# Patient Record
Sex: FEMALE | Race: WHITE | NOT HISPANIC OR LATINO | URBAN - METROPOLITAN AREA
[De-identification: names, ages, dates, MRNs, and addresses within clinical notes are randomized per-mention and may not be internally consistent; named-entity substitution may affect disease eponyms.]

---

## 2023-12-07 ENCOUNTER — HOSPITAL ENCOUNTER (EMERGENCY)
Facility: HOSPITAL | Age: 38
Discharge: HOME | End: 2023-12-08
Attending: EMERGENCY MEDICINE
Payer: MEDICAID

## 2023-12-07 ENCOUNTER — APPOINTMENT (OUTPATIENT)
Dept: CT IMAGING | Facility: HOSPITAL | Age: 38
End: 2023-12-07
Payer: MEDICAID

## 2023-12-07 DIAGNOSIS — R42 DIZZINESS: ICD-10-CM

## 2023-12-07 DIAGNOSIS — R10.84 GENERALIZED ABDOMINAL PAIN: Primary | ICD-10-CM

## 2023-12-07 LAB
ALBUMIN SERPL-MCNC: 4.7 G/DL (ref 3.5–5.2)
ALBUMIN/GLOB SERPL: 1.6 G/DL
ALP SERPL-CCNC: 45 U/L (ref 39–117)
ALT SERPL W P-5'-P-CCNC: 37 U/L (ref 1–33)
ANION GAP SERPL CALCULATED.3IONS-SCNC: 15.1 MMOL/L (ref 5–15)
AST SERPL-CCNC: 23 U/L (ref 1–32)
BACTERIA UR QL AUTO: ABNORMAL /HPF
BASOPHILS # BLD AUTO: 0.04 10*3/MM3 (ref 0–0.2)
BASOPHILS NFR BLD AUTO: 0.6 % (ref 0–1.5)
BILIRUB SERPL-MCNC: 0.2 MG/DL (ref 0–1.2)
BILIRUB UR QL STRIP: NEGATIVE
BUN SERPL-MCNC: 10 MG/DL (ref 6–20)
BUN/CREAT SERPL: 13.2 (ref 7–25)
CALCIUM SPEC-SCNC: 9.6 MG/DL (ref 8.6–10.5)
CHLORIDE SERPL-SCNC: 99 MMOL/L (ref 98–107)
CLARITY UR: CLEAR
CO2 SERPL-SCNC: 23.9 MMOL/L (ref 22–29)
COLOR UR: YELLOW
CREAT SERPL-MCNC: 0.76 MG/DL (ref 0.57–1)
DEPRECATED RDW RBC AUTO: 38.5 FL (ref 37–54)
EGFRCR SERPLBLD CKD-EPI 2021: 101.7 ML/MIN/1.73
EOSINOPHIL # BLD AUTO: 0.27 10*3/MM3 (ref 0–0.4)
EOSINOPHIL NFR BLD AUTO: 3.9 % (ref 0.3–6.2)
ERYTHROCYTE [DISTWIDTH] IN BLOOD BY AUTOMATED COUNT: 11.9 % (ref 12.3–15.4)
FLUAV SUBTYP SPEC NAA+PROBE: NOT DETECTED
FLUBV RNA ISLT QL NAA+PROBE: NOT DETECTED
GLOBULIN UR ELPH-MCNC: 2.9 GM/DL
GLUCOSE SERPL-MCNC: 142 MG/DL (ref 65–99)
GLUCOSE UR STRIP-MCNC: NEGATIVE MG/DL
HCG INTACT+B SERPL-ACNC: <0.5 MIU/ML
HCT VFR BLD AUTO: 42.1 % (ref 34–46.6)
HGB BLD-MCNC: 14.3 G/DL (ref 12–15.9)
HGB UR QL STRIP.AUTO: ABNORMAL
HOLD SPECIMEN: NORMAL
HOLD SPECIMEN: NORMAL
HYALINE CASTS UR QL AUTO: ABNORMAL /LPF
IMM GRANULOCYTES # BLD AUTO: 0.02 10*3/MM3 (ref 0–0.05)
IMM GRANULOCYTES NFR BLD AUTO: 0.3 % (ref 0–0.5)
KETONES UR QL STRIP: NEGATIVE
LEUKOCYTE ESTERASE UR QL STRIP.AUTO: NEGATIVE
LIPASE SERPL-CCNC: 31 U/L (ref 13–60)
LYMPHOCYTES # BLD AUTO: 2.56 10*3/MM3 (ref 0.7–3.1)
LYMPHOCYTES NFR BLD AUTO: 36.5 % (ref 19.6–45.3)
MCH RBC QN AUTO: 29.8 PG (ref 26.6–33)
MCHC RBC AUTO-ENTMCNC: 34 G/DL (ref 31.5–35.7)
MCV RBC AUTO: 87.7 FL (ref 79–97)
MONOCYTES # BLD AUTO: 0.33 10*3/MM3 (ref 0.1–0.9)
MONOCYTES NFR BLD AUTO: 4.7 % (ref 5–12)
NEUTROPHILS NFR BLD AUTO: 3.79 10*3/MM3 (ref 1.7–7)
NEUTROPHILS NFR BLD AUTO: 54 % (ref 42.7–76)
NITRITE UR QL STRIP: NEGATIVE
NRBC BLD AUTO-RTO: 0 /100 WBC (ref 0–0.2)
PH UR STRIP.AUTO: 5.5 [PH] (ref 5–8)
PLATELET # BLD AUTO: 329 10*3/MM3 (ref 140–450)
PMV BLD AUTO: 10.5 FL (ref 6–12)
POTASSIUM SERPL-SCNC: 3.9 MMOL/L (ref 3.5–5.2)
PROT SERPL-MCNC: 7.6 G/DL (ref 6–8.5)
PROT UR QL STRIP: NEGATIVE
RBC # BLD AUTO: 4.8 10*6/MM3 (ref 3.77–5.28)
RBC # UR STRIP: ABNORMAL /HPF
REF LAB TEST METHOD: ABNORMAL
RSV RNA NPH QL NAA+NON-PROBE: NOT DETECTED
SARS-COV-2 RNA RESP QL NAA+PROBE: NOT DETECTED
SODIUM SERPL-SCNC: 138 MMOL/L (ref 136–145)
SP GR UR STRIP: 1.01 (ref 1–1.03)
SQUAMOUS #/AREA URNS HPF: ABNORMAL /HPF
UROBILINOGEN UR QL STRIP: ABNORMAL
WBC # UR STRIP: ABNORMAL /HPF
WBC NRBC COR # BLD AUTO: 7.01 10*3/MM3 (ref 3.4–10.8)
WHOLE BLOOD HOLD COAG: NORMAL
WHOLE BLOOD HOLD SPECIMEN: NORMAL

## 2023-12-07 PROCEDURE — 87637 SARSCOV2&INF A&B&RSV AMP PRB: CPT | Performed by: EMERGENCY MEDICINE

## 2023-12-07 PROCEDURE — 36415 COLL VENOUS BLD VENIPUNCTURE: CPT

## 2023-12-07 PROCEDURE — 81001 URINALYSIS AUTO W/SCOPE: CPT

## 2023-12-07 PROCEDURE — 84702 CHORIONIC GONADOTROPIN TEST: CPT

## 2023-12-07 PROCEDURE — 83690 ASSAY OF LIPASE: CPT

## 2023-12-07 PROCEDURE — 80053 COMPREHEN METABOLIC PANEL: CPT

## 2023-12-07 PROCEDURE — 74177 CT ABD & PELVIS W/CONTRAST: CPT

## 2023-12-07 PROCEDURE — 25510000001 IOPAMIDOL PER 1 ML

## 2023-12-07 PROCEDURE — 99285 EMERGENCY DEPT VISIT HI MDM: CPT

## 2023-12-07 PROCEDURE — 85025 COMPLETE CBC W/AUTO DIFF WBC: CPT

## 2023-12-07 RX ORDER — SODIUM CHLORIDE 0.9 % (FLUSH) 0.9 %
10 SYRINGE (ML) INJECTION AS NEEDED
Status: DISCONTINUED | OUTPATIENT
Start: 2023-12-07 | End: 2023-12-08 | Stop reason: HOSPADM

## 2023-12-07 RX ADMIN — IOPAMIDOL 90 ML: 755 INJECTION, SOLUTION INTRAVENOUS at 22:07

## 2023-12-08 VITALS
TEMPERATURE: 98.3 F | RESPIRATION RATE: 18 BRPM | DIASTOLIC BLOOD PRESSURE: 109 MMHG | BODY MASS INDEX: 34.8 KG/M2 | OXYGEN SATURATION: 99 % | SYSTOLIC BLOOD PRESSURE: 139 MMHG | WEIGHT: 196.43 LBS | HEART RATE: 88 BPM | HEIGHT: 63 IN

## 2023-12-08 PROCEDURE — 93005 ELECTROCARDIOGRAM TRACING: CPT

## 2023-12-08 RX ORDER — MECLIZINE HYDROCHLORIDE 25 MG/1
25 TABLET ORAL ONCE
Status: COMPLETED | OUTPATIENT
Start: 2023-12-08 | End: 2023-12-08

## 2023-12-08 RX ORDER — DICYCLOMINE HCL 20 MG
20 TABLET ORAL EVERY 6 HOURS
Qty: 20 TABLET | Refills: 0 | Status: SHIPPED | OUTPATIENT
Start: 2023-12-08

## 2023-12-08 RX ORDER — MECLIZINE HYDROCHLORIDE 25 MG/1
25 TABLET ORAL 3 TIMES DAILY PRN
Qty: 15 TABLET | Refills: 0 | Status: SHIPPED | OUTPATIENT
Start: 2023-12-08

## 2023-12-08 RX ORDER — DICYCLOMINE HYDROCHLORIDE 10 MG/1
20 CAPSULE ORAL ONCE
Status: COMPLETED | OUTPATIENT
Start: 2023-12-08 | End: 2023-12-08

## 2023-12-08 RX ADMIN — DICYCLOMINE HYDROCHLORIDE 20 MG: 10 CAPSULE ORAL at 00:55

## 2023-12-08 RX ADMIN — MECLIZINE HYDROCHLORIDE 25 MG: 25 TABLET ORAL at 00:55

## 2023-12-08 NOTE — DISCHARGE INSTRUCTIONS
Please be sure to follow-up with your primary care provider your dizziness and blood pressure which was elevated here in the ED today.  You should also follow-up with the GI specialist listed here, Dr. Rizo, concerning your ongoing diarrhea and abdominal pain as well as the CT findings of fatty liver.

## 2023-12-08 NOTE — ED PROVIDER NOTES
Time: 8:47 PM EST  Date of encounter:  12/7/2023  Independent Historian/Clinical History and Information was obtained by:   Patient    History is limited by: N/A    Chief Complaint   Patient presents with    Dizziness    Nausea    Abdominal Pain    Migraine    Flank Pain    Syncope         History of Present Illness:  Patient is a 38 y.o. year old female who presents to the emergency department for evaluation of bilateral flank pain and abdominal pain ongoing for several days.  Patient also reports nausea without vomiting.  Denies dysuria, hematuria, diarrhea.  Reports episodes of dizziness with near syncope at home.  Denies any loss of consciousness.  Denies any fevers, chills, body aches.  Denies chest pain or shortness of breath. (NAVYA Madera Provider in Triage)      Patient Care Team  Primary Care Provider: Provider, No Known    Past Medical History:     No Known Allergies  History reviewed. No pertinent past medical history.  Past Surgical History:   Procedure Laterality Date    TUBAL ABDOMINAL LIGATION       History reviewed. No pertinent family history.    Home Medications:  Prior to Admission medications    Medication Sig Start Date End Date Taking? Authorizing Provider   acetaminophen (TYLENOL) 500 MG tablet Take 1 tablet by mouth Every 6 (Six) Hours As Needed for Mild Pain. 8/25/23   Markus Lopez PA   diclofenac (VOLTAREN) 50 MG EC tablet Take 1 tablet by mouth 3 (Three) Times a Day. 8/25/23   Markus Lopez PA   penicillin v potassium (VEETID) 500 MG tablet Take 1 tablet by mouth 4 (Four) Times a Day. 8/25/23   Markus Lopez PA        Social History:   Social History     Tobacco Use    Smoking status: Never    Smokeless tobacco: Never   Vaping Use    Vaping Use: Never used   Substance Use Topics    Alcohol use: Not Currently     Comment: occ    Drug use: Never         Review of Systems:  Review of Systems   Constitutional:  Negative for fever.   HENT:  Negative for sore throat.   "  Eyes: Negative.    Respiratory:  Negative for cough and shortness of breath.    Cardiovascular:  Negative for chest pain.   Gastrointestinal:  Positive for abdominal distention and nausea. Negative for abdominal pain, diarrhea and vomiting.   Genitourinary:  Positive for flank pain. Negative for dysuria, frequency, hematuria and urgency.   Musculoskeletal:  Negative for neck pain.   Skin:  Negative for rash.   Allergic/Immunologic: Negative.    Neurological:  Positive for dizziness and headaches. Negative for weakness and numbness.   Hematological: Negative.    Psychiatric/Behavioral: Negative.     All other systems reviewed and are negative.       Physical Exam:  BP (!) 139/109 (BP Location: Left arm, Patient Position: Sitting)   Pulse 88   Temp 98.3 °F (36.8 °C) (Oral)   Resp 18   Ht 160 cm (63\")   Wt 89.1 kg (196 lb 6.9 oz)   SpO2 99%   BMI 34.80 kg/m²         Physical Exam  Vitals and nursing note reviewed.   Constitutional:       General: She is not in acute distress.     Appearance: Normal appearance. She is not toxic-appearing.   HENT:      Head: Normocephalic and atraumatic.      Jaw: There is normal jaw occlusion.      Mouth/Throat:      Mouth: Mucous membranes are moist.   Eyes:      General: Lids are normal.      Extraocular Movements: Extraocular movements intact.      Conjunctiva/sclera: Conjunctivae normal.      Pupils: Pupils are equal, round, and reactive to light.   Cardiovascular:      Rate and Rhythm: Normal rate and regular rhythm.      Pulses: Normal pulses.      Heart sounds: Normal heart sounds.   Pulmonary:      Effort: Pulmonary effort is normal. No respiratory distress.      Breath sounds: Normal breath sounds. No wheezing or rhonchi.   Abdominal:      General: Abdomen is flat. There is no distension.      Palpations: Abdomen is soft.      Tenderness: There is no abdominal tenderness. There is no guarding or rebound.   Musculoskeletal:         General: Normal range of motion.      " Cervical back: Normal range of motion and neck supple.      Right lower leg: No edema.      Left lower leg: No edema.   Skin:     General: Skin is warm and dry.   Neurological:      General: No focal deficit present.      Mental Status: She is alert and oriented to person, place, and time. Mental status is at baseline.   Psychiatric:         Mood and Affect: Mood normal.         Behavior: Behavior normal.              Procedures:  Procedures      Medical Decision Making:      Comorbidities that affect care:    Migraines    External Notes reviewed:    Previous Clinic Note: Urgent care visit from 8/25/2023      The following orders were placed and all results were independently analyzed by me:  Orders Placed This Encounter   Procedures    COVID-19, FLU A/B, RSV PCR 1 HR TAT - Swab, Nasopharynx    CT Abdomen Pelvis With Contrast    [place] Draw    Comprehensive Metabolic Panel    Lipase    Urinalysis With Microscopic If Indicated (No Culture) - Urine, Clean Catch    hCG, Quantitative, Pregnancy    CBC Auto Differential    Urinalysis, Microscopic Only - Urine, Clean Catch    NPO Diet NPO Type: Strict NPO    Undress & Gown    Orthostatic Vitals    ECG 12 Lead Other; dizziness    Insert Peripheral IV    CBC & Differential    Green Top (Gel)    Lavender Top    Gold Top - SST    Light Blue Top       Medications Given in the Emergency Department:  Medications   sodium chloride 0.9 % flush 10 mL (has no administration in time range)   iopamidol (ISOVUE-370) 76 % injection 100 mL (90 mL Intravenous Given 12/7/23 2207)   meclizine (ANTIVERT) tablet 25 mg (25 mg Oral Given 12/8/23 0055)   dicyclomine (BENTYL) capsule 20 mg (20 mg Oral Given 12/8/23 0055)        ED Course:    The patient was initially evaluated in the triage area where orders were placed. The patient was later dispositioned by NAVYA Chong.      The patient was advised to stay for completion of workup which includes but is not limited to communication of  labs and radiological results, reassessment and plan. The patient was advised that leaving prior to disposition by a provider could result in critical findings that are not communicated to the patient.     ED Course as of 12/08/23 0118   u Dec 07, 2023   2049   --- PROVIDER IN TRIAGE NOTE ---    The patient was evaluated by Carol ho in triage. Orders were placed and the patient is currently awaiting disposition.      [CE]   Fri Dec 08, 2023   0117 Orthostatic vital signs were negative for orthostatic hypotension. [RM]      ED Course User Index  [CE] Carol Nance APRN  [RM] Abena Ballesteros APRN       Labs:    Lab Results (last 24 hours)       Procedure Component Value Units Date/Time    CBC & Differential [[id number]]  (Abnormal) Collected: 12/07/23 2052    Specimen: Blood from Arm, Right Updated: 12/07/23 2107    Narrative:      The following orders were created for panel order CBC & Differential.  Procedure                               Abnormality         Status                     ---------                               -----------         ------                     CBC Auto Differential[[id number]]        Abnormal            Final result                 Please view results for these tests on the individual orders.    Comprehensive Metabolic Panel [[id number]]  (Abnormal) Collected: 12/07/23 2052    Specimen: Blood from Arm, Right Updated: 12/07/23 2133     Glucose 142 mg/dL      BUN 10 mg/dL      Creatinine 0.76 mg/dL      Sodium 138 mmol/L      Potassium 3.9 mmol/L      Chloride 99 mmol/L      CO2 23.9 mmol/L      Calcium 9.6 mg/dL      Total Protein 7.6 g/dL      Albumin 4.7 g/dL      ALT (SGPT) 37 U/L      AST (SGOT) 23 U/L      Alkaline Phosphatase 45 U/L      Total Bilirubin 0.2 mg/dL      Globulin 2.9 gm/dL      A/G Ratio 1.6 g/dL      BUN/Creatinine Ratio 13.2     Anion Gap 15.1 mmol/L      eGFR 101.7 mL/min/1.73     Narrative:      GFR Normal >60  Chronic Kidney Disease <60  Kidney Failure  <15      Lipase [[id number]]  (Normal) Collected: 12/07/23 2052    Specimen: Blood from Arm, Right Updated: 12/07/23 2133     Lipase 31 U/L     hCG, Quantitative, Pregnancy [[id number]] Collected: 12/07/23 2052    Specimen: Blood from Arm, Right Updated: 12/07/23 2130     HCG Quantitative <0.50 mIU/mL     Narrative:      HCG Ranges by Gestational Age    Females - non-pregnant premenopausal   </= 1mIU/mL HCG  Females - postmenopausal               </= 7mIU/mL HCG    3 Weeks       5.4   -      72 mIU/mL  4 Weeks      10.2   -     708 mIU/mL  5 Weeks       217   -   8,245 mIU/mL  6 Weeks       152   -  32,177 mIU/mL  7 Weeks     4,059   - 153,767 mIU/mL  8 Weeks    31,366   - 149,094 mIU/mL  9 Weeks    59,109   - 135,901 mIU/mL  10 Weeks   44,186   - 170,409 mIU/mL  12 Weeks   27,107   - 201,615 mIU/mL  14 Weeks   24,302   -  93,646 mIU/mL  15 Weeks   12,540   -  69,747 mIU/mL  16 Weeks    8,904   -  55,332 mIU/mL  17 Weeks    8,240   -  51,793 mIU/mL  18 Weeks    9,649   -  55,271 mIU/mL      CBC Auto Differential [[id number]]  (Abnormal) Collected: 12/07/23 2052    Specimen: Blood from Arm, Right Updated: 12/07/23 2107     WBC 7.01 10*3/mm3      RBC 4.80 10*6/mm3      Hemoglobin 14.3 g/dL      Hematocrit 42.1 %      MCV 87.7 fL      MCH 29.8 pg      MCHC 34.0 g/dL      RDW 11.9 %      RDW-SD 38.5 fl      MPV 10.5 fL      Platelets 329 10*3/mm3      Neutrophil % 54.0 %      Lymphocyte % 36.5 %      Monocyte % 4.7 %      Eosinophil % 3.9 %      Basophil % 0.6 %      Immature Grans % 0.3 %      Neutrophils, Absolute 3.79 10*3/mm3      Lymphocytes, Absolute 2.56 10*3/mm3      Monocytes, Absolute 0.33 10*3/mm3      Eosinophils, Absolute 0.27 10*3/mm3      Basophils, Absolute 0.04 10*3/mm3      Immature Grans, Absolute 0.02 10*3/mm3      nRBC 0.0 /100 WBC     Urinalysis With Microscopic If Indicated (No Culture) - Urine, Clean Catch [[id number]]  (Abnormal) Collected: 12/07/23 2057    Specimen: Urine, Clean Catch Updated:  12/07/23 2116     Color, UA Yellow     Appearance, UA Clear     pH, UA 5.5     Specific Gravity, UA 1.012     Glucose, UA Negative     Ketones, UA Negative     Bilirubin, UA Negative     Blood, UA Small (1+)     Protein, UA Negative     Leuk Esterase, UA Negative     Nitrite, UA Negative     Urobilinogen, UA 0.2 E.U./dL    Urinalysis, Microscopic Only - Urine, Clean Catch [[id number]]  (Abnormal) Collected: 12/07/23 2057    Specimen: Urine, Clean Catch Updated: 12/07/23 2116     RBC, UA 6-10 /HPF      WBC, UA 0-2 /HPF      Bacteria, UA 1+ /HPF      Squamous Epithelial Cells, UA 0-2 /HPF      Hyaline Casts, UA None Seen /LPF      Methodology Automated Microscopy    COVID-19, FLU A/B, RSV PCR 1 HR TAT - Swab, Nasopharynx [[id number]]  (Normal) Collected: 12/07/23 2244    Specimen: Swab from Nasopharynx Updated: 12/07/23 2330     COVID19 Not Detected     Influenza A PCR Not Detected     Influenza B PCR Not Detected     RSV, PCR Not Detected    Narrative:      Fact sheet for providers: https://www.fda.gov/media/164489/download    Fact sheet for patients: https://www.fda.gov/media/518812/download    Test performed by PCR.             Imaging:    CT Abdomen Pelvis With Contrast    Result Date: 12/7/2023  PROCEDURE: CT ABDOMEN PELVIS W CONTRAST  COMPARISON: None  INDICATIONS: RIGHTABDOMINAL/FLANK PAIN X 3 DAYS  TECHNIQUE: After obtaining the patient's consent, CT images were created with non-ionic intravenous contrast material.   PROTOCOL:   Standard imaging protocol performed    RADIATION:   DLP: 1046.4 mGy*cm   Automated exposure control was utilized to minimize radiation dose. CONTRAST: 90 cc Isovue 370 I.V. LABS:   eGFR: 101.7 ml/min/1.73m2  FINDINGS:  No consolidations or pleural effusions are seen involving the lung bases.  Heterogeneous fatty infiltration is seen throughout the liver.  Areas of fatty sparing are noted.  There is a hyperdense focus at the dome of the liver which is felt to be related to differential  enhancement of the liver parenchyma.  The spleen and pancreas are unremarkable. The gallbladder and biliary tree are unremarkable. The bilateral adrenal glands are symmetric and unremarkable.  A left renal cyst is noted.  The bilateral kidneys are otherwise unremarkable.  No significant bowel dilatation or obstruction is seen. A normal-appearing appendix is observed. There is no evidence for acute appendicitis. No abnormal fluid collections are seen. No significant free fluid is observed. No significant lymphadenopathy is seen throughout the abdomen or pelvis. The bladder is decompressed. The celiac and superior mesenteric arterial distributions are well opacified without evidence for occlusion.  No acute osseous abnormalities are seen.        1. No evidence for acute abnormality throughout the abdomen or pelvis. 2. Evidence for diffuse heterogeneous fatty infiltration.  Apparent hyperdense foci are seen which are likely related to areas of focal fatty sparing and differential enhancement for the phase of contrast.  Enhancing lesions are felt unlikely.  Follow-up nonemergent outpatient MRI of the liver may be considered for confirmation.     EVERETT CALLES MD       Electronically Signed and Approved By: EVERETT CALLES MD on 12/07/2023 at 22:21                Differential Diagnosis and Discussion:      Abdominal Pain: Based on the patient's signs and symptoms, I considered abdominal aortic aneurysm, small bowel obstruction, pancreatitis, acute cholecystitis, acute appendecitis, peptic ulcer disease, gastritis, colitis, endocrine disorders, irritable bowel syndrome and other differential diagnosis an etiology of the patient's abdominal pain.  Dizziness: Based on the patient's history, signs, and symptoms, the diffential diagnosis includes but is not limited to meningitis, stroke, sepsis, subarachnoid hemorrhage, intracranial bleeding, encephalitis, vertigo, electrolyte imbalance, and metabolic disorders.    All labs  were reviewed and interpreted by me.  EKG was interpreted by supervising attending.  CT scan radiology impression was interpreted by me.    MDM     Amount and/or Complexity of Data Reviewed  Clinical lab tests: reviewed  Tests in the radiology section of CPT®: reviewed    The patient is resting comfortably and feels better, is alert, talkative and in no distress. The repeat examination is unremarkable and benign. The patient is neurologically intact, has a normal mental status and this ambulatory in the ED. The history, exam, diagnostic testing in the patient's current condition do not suggest meningitis, stroke, sepsis, subarachnoid hemorrhage, intracranial bleeding, encephalitis or other significant pathology that would warrant further testing, continued ED treatment, admission, neurological consultation, or other specialist evaluation at this point. The vital signs have been stable. The patient's condition is stable and appropriate for discharge. The patient will pursue further outpatient evaluation with the primary care physician or other designated or consulting position as indicated in the discharge instructions.            Patient Care Considerations:    CONSULT: I considered consulting GI, however no acute complications CT HEAD: I considered ordering a noncontrast CT of the head, however patient has no neurological deficits.      Consultants/Shared Management Plan:    None    Social Determinants of Health:    Patient is independent, reliable, and has access to care.       Disposition and Care Coordination:        I have explained the patient´s condition, diagnoses and treatment plan based on the information available to me at this time. I have answered questions and addressed any concerns. The patient has a good  understanding of the patient´s diagnosis, condition, and treatment plan as can be expected at this point. The vital signs have been stable. The patient´s condition is stable and appropriate for  discharge from the emergency department.      The patient will pursue further outpatient evaluation with the primary care physician or other designated or consulting physician as outlined in the discharge instructions. They are agreeable to this plan of care and follow-up instructions have been explained in detail. The patient has received these instructions in written format and have expressed an understanding of the discharge instructions. The patient is aware that any significant change in condition or worsening of symptoms should prompt an immediate return to this or the closest emergency department or call to 911.  I have explained discharge medications and the need for follow up with the patient/caretakers. This was also printed in the discharge instructions. Patient was discharged with the following medications and follow up:      Medication List        New Prescriptions      dicyclomine 20 MG tablet  Commonly known as: BENTYL  Take 1 tablet by mouth Every 6 (Six) Hours.     meclizine 25 MG tablet  Commonly known as: ANTIVERT  Take 1 tablet by mouth 3 (Three) Times a Day As Needed for Dizziness.               Where to Get Your Medications        These medications were sent to [id number] - [place], [place] - [address]  AT [facility] [address] - [phone] [facility] [phone]   [address] [facility] [address]      Phone: [phone]   dicyclomine 20 MG tablet  meclizine 25 MG tablet      Provider, No Known  [facility] [id number]    Call in 2 days      Víctor Rizo MD  [address] [facility] [facility] [place] [address]  [phone]    Call in 2 days         Final diagnoses:   Generalized abdominal pain   Dizziness        ED Disposition       ED Disposition   Discharge    Condition   Stable    Comment   --               This medical record created using voice recognition software.             Abena Ballesteros, NAVYA  12/08/23 0118

## 2023-12-10 LAB
QT INTERVAL: 378 MS
QTC INTERVAL: 447 MS

## 2024-04-25 ENCOUNTER — OFFICE VISIT (OUTPATIENT)
Dept: FAMILY MEDICINE CLINIC | Facility: CLINIC | Age: 39
End: 2024-04-25
Payer: MEDICAID

## 2024-04-25 VITALS
WEIGHT: 200 LBS | SYSTOLIC BLOOD PRESSURE: 147 MMHG | DIASTOLIC BLOOD PRESSURE: 95 MMHG | HEIGHT: 63 IN | HEART RATE: 117 BPM | OXYGEN SATURATION: 95 % | BODY MASS INDEX: 35.44 KG/M2

## 2024-04-25 DIAGNOSIS — Z80.0 FAMILY HISTORY OF COLON CANCER: ICD-10-CM

## 2024-04-25 DIAGNOSIS — K92.1 BLOOD IN STOOL: ICD-10-CM

## 2024-04-25 DIAGNOSIS — Z12.31 BREAST CANCER SCREENING BY MAMMOGRAM: ICD-10-CM

## 2024-04-25 DIAGNOSIS — R10.32 LLQ PAIN: ICD-10-CM

## 2024-04-25 DIAGNOSIS — Z11.59 NEED FOR HEPATITIS C SCREENING TEST: ICD-10-CM

## 2024-04-25 DIAGNOSIS — R11.0 NAUSEA: ICD-10-CM

## 2024-04-25 DIAGNOSIS — R19.7 DIARRHEA, UNSPECIFIED TYPE: ICD-10-CM

## 2024-04-25 DIAGNOSIS — Z13.220 SCREENING FOR LIPID DISORDERS: ICD-10-CM

## 2024-04-25 DIAGNOSIS — Z76.89 ENCOUNTER TO ESTABLISH CARE: ICD-10-CM

## 2024-04-25 DIAGNOSIS — R03.0 ELEVATED BLOOD PRESSURE READING IN OFFICE WITHOUT DIAGNOSIS OF HYPERTENSION: ICD-10-CM

## 2024-04-25 DIAGNOSIS — Z00.00 ANNUAL PHYSICAL EXAM: Primary | ICD-10-CM

## 2024-04-25 DIAGNOSIS — Z12.4 SCREENING FOR CERVICAL CANCER: ICD-10-CM

## 2024-04-25 DIAGNOSIS — Z13.29 SCREENING FOR THYROID DISORDER: ICD-10-CM

## 2024-04-25 NOTE — PROGRESS NOTES
Chief Complaint  Establish Care and Annual Exam    SUBJECTIVE  Clover Caicedo presents to [facility]    History of Present Illness  Patient is a 38-year-old female who presents today to establish care.  Pt unsure of previous PCP.  She is due annual physical exam, to be done in office today.  She denies any chronic conditions.  Patient reports a history of gestational diabetes.  Patient reports she is here in [place] as her  is working at the [employer].  Patient reports she believes they will only be here until about August.    Patient reports complaints of left lower abdominal pain, diarrhea for several months.  Patient was seen in the ER November CT of the abdomen showed slight liver fatty infiltrates otherwise normal.  Patient reports 1 week ago she had blood in her stool.  Patient reports she had an episode last summer she blood in her stool as well patient reports family history of colon cancer in her grandfather and her father has Crohn's disease.  Also has occasional nausea with this.    Patient's blood pressure is noted to be elevated in office today at 147/95.  Patient reports she had some hypertension during pregnancy but otherwise normal.  Patient reports she does have a history of whitecoat syndrome.  Denies any chest pain.    TDAP- 4/2016    Mammogram- she is due, orders to be placed today.     PAP- 2020, had tubal ligation and ablation. Pt has hx of abnormal paps. We will refer to GYN to establish care and have PAP done.     Patient is due labs. Orders placed at today's visit. Discussed with patient that these are fasting labs.     No other concerns or complaints at this time.  Patient denies any constipation, urinary urgency, frequency, or dysuria, chest pain, shortness of breath or syncope.           History reviewed. No pertinent past medical history.   History reviewed. No pertinent family history.   Past Surgical History:   Procedure Laterality Date     "TUBAL ABDOMINAL LIGATION        No current outpatient medications on file.    OBJECTIVE  Vital Signs:   /95   Pulse 117   Ht 160 cm (63\")   Wt 90.7 kg (200 lb)   SpO2 95%   BMI 35.43 kg/m²    Estimated body mass index is 35.43 kg/m² as calculated from the following:    Height as of this encounter: 160 cm (63\").    Weight as of this encounter: 90.7 kg (200 lb).     Wt Readings from Last 3 Encounters:   04/25/24 90.7 kg (200 lb)   12/07/23 89.1 kg (196 lb 6.9 oz)   08/25/23 81.6 kg (180 lb)     BP Readings from Last 3 Encounters:   04/25/24 147/95   12/08/23 (!) 139/109   08/25/23 152/97       Physical Exam  Vitals reviewed.   Constitutional:       General: She is awake. She is not in acute distress.     Appearance: She is well-developed and well-groomed. She is not ill-appearing.   HENT:      Head: Normocephalic and atraumatic.      Right Ear: Tympanic membrane, ear canal and external ear normal.      Left Ear: Tympanic membrane, ear canal and external ear normal.      Nose: Nose normal.      Mouth/Throat:      Mouth: Mucous membranes are moist.      Pharynx: Oropharynx is clear. No oropharyngeal exudate or posterior oropharyngeal erythema.   Eyes:      Extraocular Movements: Extraocular movements intact.      Conjunctiva/sclera: Conjunctivae normal.      Pupils: Pupils are equal, round, and reactive to light.   Neck:      Thyroid: No thyroid mass, thyromegaly or thyroid tenderness.      Vascular: No carotid bruit.   Cardiovascular:      Rate and Rhythm: Normal rate and regular rhythm.      Pulses:           Carotid pulses are 2+ on the right side and 2+ on the left side.     Heart sounds: Normal heart sounds.   Pulmonary:      Effort: Pulmonary effort is normal.      Breath sounds: Normal breath sounds.   Abdominal:      General: Abdomen is flat. Bowel sounds are normal. There is no distension.      Palpations: Abdomen is soft.      Tenderness: There is no abdominal tenderness.   Musculoskeletal:         " General: Normal range of motion.      Cervical back: Normal range of motion and neck supple. No rigidity or tenderness.   Lymphadenopathy:      Cervical: No cervical adenopathy.   Skin:     General: Skin is warm and dry.   Neurological:      Mental Status: She is alert and oriented to person, place, and time.   Psychiatric:         Mood and Affect: Mood normal.         Behavior: Behavior normal. Behavior is cooperative.         Thought Content: Thought content normal.         Judgment: Judgment normal.          Result Review    Common labs          12/7/2023    20:52   Common Labs   Glucose 142    BUN 10    Creatinine 0.76    Sodium 138    Potassium 3.9    Chloride 99    Calcium 9.6    Albumin 4.7    Total Bilirubin 0.2    Alkaline Phosphatase 45    AST (SGOT) 23    ALT (SGPT) 37    WBC 7.01    Hemoglobin 14.3    Hematocrit 42.1    Platelets 329        No Images in the past 120 days found..      The above data has been reviewed by NAVYA Diego 04/25/2024 09:56 EDT.          Patient Care Team:  Kayla Lane APRN as PCP - General (Nurse Practitioner)           ASSESSMENT & PLAN    Diagnoses and all orders for this visit:    1. Annual physical exam (Primary)  Comments:  preventative counseling  Healthy diet  Daily exercise  Get adequate sleep  Orders:  -     Comprehensive Metabolic Panel; Future  -     CBC & Differential; Future  -     Lipid Panel; Future  -     TSH+Free T4; Future  -     Hepatitis C antibody; Future    2. Encounter to establish care  Comments:  Medical history and medications reviewed    3. Screening for lipid disorders  -     Lipid Panel; Future    4. Screening for thyroid disorder  -     TSH+Free T4; Future    5. Need for hepatitis C screening test  -     Hepatitis C antibody; Future    6. Breast cancer screening by mammogram  -     Mammo Screening Digital Tomosynthesis Bilateral With CAD; Future    7. Screening for cervical cancer  -     Ambulatory Referral to Gynecology    8. LLQ pain  -      Ambulatory Referral to Gastroenterology    9. Diarrhea, unspecified type  -     Ambulatory Referral to Gastroenterology    10. Nausea  -     Ambulatory Referral to Gastroenterology    11. Blood in stool  -     Ambulatory Referral to Gastroenterology    12. Family history of colon cancer  -     Ambulatory Referral to Gastroenterology    13. Elevated blood pressure reading in office without diagnosis of hypertension       Will refer to GI for assessment of her abdominal pain diarrhea and blood in stool.  Advised patient that they may be booked out for several months.  Advised patient we will call the office to see if we can get her in any sooner.  Ordered ambulatory blood pressure monitoring as blood pressure is elevated in office today.  Instructed patient to check her blood pressure daily and keep a log and follow-up in 1 month for review.    Tobacco Use: Low Risk  (4/25/2024)    Patient History     Smoking Tobacco Use: Never     Smokeless Tobacco Use: Never     Passive Exposure: Never       Follow Up     Return in about 1 month (around 5/25/2024) for bp review.      Patient was given instructions and counseling regarding her condition or for health maintenance advice. Please see specific information pulled into the AVS if appropriate.   I have reviewed information obtained and documented by others and I have confirmed the accuracy of this documented note.    NAVYA Diego

## 2024-04-26 ENCOUNTER — PATIENT ROUNDING (BHMG ONLY) (OUTPATIENT)
Dept: FAMILY MEDICINE CLINIC | Facility: CLINIC | Age: 39
End: 2024-04-26
Payer: MEDICAID

## 2024-04-29 ENCOUNTER — LAB (OUTPATIENT)
Dept: LAB | Facility: HOSPITAL | Age: 39
End: 2024-04-29

## 2024-04-29 DIAGNOSIS — Z13.220 SCREENING FOR LIPID DISORDERS: ICD-10-CM

## 2024-04-29 DIAGNOSIS — Z11.59 NEED FOR HEPATITIS C SCREENING TEST: ICD-10-CM

## 2024-04-29 DIAGNOSIS — Z00.00 ANNUAL PHYSICAL EXAM: ICD-10-CM

## 2024-04-29 DIAGNOSIS — Z13.29 SCREENING FOR THYROID DISORDER: ICD-10-CM

## 2024-04-29 DIAGNOSIS — R73.09 ELEVATED GLUCOSE: ICD-10-CM

## 2024-04-29 LAB
ALBUMIN SERPL-MCNC: 4.6 G/DL (ref 3.5–5.2)
ALBUMIN/GLOB SERPL: 1.6 G/DL
ALP SERPL-CCNC: 38 U/L (ref 39–117)
ALT SERPL W P-5'-P-CCNC: 31 U/L (ref 1–33)
ANION GAP SERPL CALCULATED.3IONS-SCNC: 12.7 MMOL/L (ref 5–15)
AST SERPL-CCNC: 21 U/L (ref 1–32)
BASOPHILS # BLD AUTO: 0.04 10*3/MM3 (ref 0–0.2)
BASOPHILS NFR BLD AUTO: 0.6 % (ref 0–1.5)
BILIRUB SERPL-MCNC: 0.4 MG/DL (ref 0–1.2)
BUN SERPL-MCNC: 10 MG/DL (ref 6–20)
BUN/CREAT SERPL: 14.9 (ref 7–25)
CALCIUM SPEC-SCNC: 9.2 MG/DL (ref 8.6–10.5)
CHLORIDE SERPL-SCNC: 103 MMOL/L (ref 98–107)
CHOLEST SERPL-MCNC: 223 MG/DL (ref 0–200)
CO2 SERPL-SCNC: 23.3 MMOL/L (ref 22–29)
CREAT SERPL-MCNC: 0.67 MG/DL (ref 0.57–1)
DEPRECATED RDW RBC AUTO: 39.2 FL (ref 37–54)
EGFRCR SERPLBLD CKD-EPI 2021: 113.5 ML/MIN/1.73
EOSINOPHIL # BLD AUTO: 0.19 10*3/MM3 (ref 0–0.4)
EOSINOPHIL NFR BLD AUTO: 2.9 % (ref 0.3–6.2)
ERYTHROCYTE [DISTWIDTH] IN BLOOD BY AUTOMATED COUNT: 12.1 % (ref 12.3–15.4)
GLOBULIN UR ELPH-MCNC: 2.8 GM/DL
GLUCOSE SERPL-MCNC: 109 MG/DL (ref 65–99)
HCT VFR BLD AUTO: 41.5 % (ref 34–46.6)
HCV AB SER QL: NORMAL
HDLC SERPL-MCNC: 58 MG/DL (ref 40–60)
HGB BLD-MCNC: 14.1 G/DL (ref 12–15.9)
IMM GRANULOCYTES # BLD AUTO: 0.01 10*3/MM3 (ref 0–0.05)
IMM GRANULOCYTES NFR BLD AUTO: 0.2 % (ref 0–0.5)
LDLC SERPL CALC-MCNC: 138 MG/DL (ref 0–100)
LDLC/HDLC SERPL: 2.31 {RATIO}
LYMPHOCYTES # BLD AUTO: 2.17 10*3/MM3 (ref 0.7–3.1)
LYMPHOCYTES NFR BLD AUTO: 32.6 % (ref 19.6–45.3)
MCH RBC QN AUTO: 30.3 PG (ref 26.6–33)
MCHC RBC AUTO-ENTMCNC: 34 G/DL (ref 31.5–35.7)
MCV RBC AUTO: 89.1 FL (ref 79–97)
MONOCYTES # BLD AUTO: 0.38 10*3/MM3 (ref 0.1–0.9)
MONOCYTES NFR BLD AUTO: 5.7 % (ref 5–12)
NEUTROPHILS NFR BLD AUTO: 3.87 10*3/MM3 (ref 1.7–7)
NEUTROPHILS NFR BLD AUTO: 58 % (ref 42.7–76)
NRBC BLD AUTO-RTO: 0 /100 WBC (ref 0–0.2)
PLATELET # BLD AUTO: 299 10*3/MM3 (ref 140–450)
PMV BLD AUTO: 11.1 FL (ref 6–12)
POTASSIUM SERPL-SCNC: 4.1 MMOL/L (ref 3.5–5.2)
PROT SERPL-MCNC: 7.4 G/DL (ref 6–8.5)
RBC # BLD AUTO: 4.66 10*6/MM3 (ref 3.77–5.28)
SODIUM SERPL-SCNC: 139 MMOL/L (ref 136–145)
T4 FREE SERPL-MCNC: 1.01 NG/DL (ref 0.93–1.7)
TRIGL SERPL-MCNC: 154 MG/DL (ref 0–150)
TSH SERPL DL<=0.05 MIU/L-ACNC: 1.85 UIU/ML (ref 0.27–4.2)
VLDLC SERPL-MCNC: 27 MG/DL (ref 5–40)
WBC NRBC COR # BLD AUTO: 6.66 10*3/MM3 (ref 3.4–10.8)

## 2024-04-29 PROCEDURE — 36415 COLL VENOUS BLD VENIPUNCTURE: CPT

## 2024-04-29 PROCEDURE — 83036 HEMOGLOBIN GLYCOSYLATED A1C: CPT

## 2024-04-29 PROCEDURE — 86803 HEPATITIS C AB TEST: CPT

## 2024-04-29 PROCEDURE — 80053 COMPREHEN METABOLIC PANEL: CPT

## 2024-04-29 PROCEDURE — 84439 ASSAY OF FREE THYROXINE: CPT

## 2024-04-29 PROCEDURE — 80061 LIPID PANEL: CPT

## 2024-04-29 PROCEDURE — 85025 COMPLETE CBC W/AUTO DIFF WBC: CPT

## 2024-04-29 PROCEDURE — 84443 ASSAY THYROID STIM HORMONE: CPT

## 2024-04-30 DIAGNOSIS — R73.03 PREDIABETES: ICD-10-CM

## 2024-04-30 DIAGNOSIS — R73.09 ELEVATED GLUCOSE: Primary | ICD-10-CM

## 2024-04-30 DIAGNOSIS — E78.2 MIXED HYPERLIPIDEMIA: Primary | ICD-10-CM

## 2024-04-30 LAB — HBA1C MFR BLD: 5.8 % (ref 4.8–5.6)

## 2024-05-01 ENCOUNTER — PATIENT MESSAGE (OUTPATIENT)
Dept: FAMILY MEDICINE CLINIC | Facility: CLINIC | Age: 39
End: 2024-05-01

## 2024-05-01 DIAGNOSIS — R10.32 LLQ PAIN: Primary | ICD-10-CM

## 2024-05-01 RX ORDER — DICYCLOMINE HYDROCHLORIDE 10 MG/1
20 CAPSULE ORAL
Qty: 120 CAPSULE | Refills: 5 | Status: SHIPPED | OUTPATIENT
Start: 2024-05-01

## 2024-05-08 ENCOUNTER — HOSPITAL ENCOUNTER (OUTPATIENT)
Dept: MAMMOGRAPHY | Facility: HOSPITAL | Age: 39
Discharge: HOME | End: 2024-05-08

## 2024-05-08 DIAGNOSIS — Z12.31 BREAST CANCER SCREENING BY MAMMOGRAM: ICD-10-CM

## 2024-05-08 PROCEDURE — 77063 BREAST TOMOSYNTHESIS BI: CPT

## 2024-05-08 PROCEDURE — 77067 SCR MAMMO BI INCL CAD: CPT

## 2024-05-24 ENCOUNTER — OFFICE VISIT (OUTPATIENT)
Dept: FAMILY MEDICINE CLINIC | Facility: CLINIC | Age: 39
End: 2024-05-24

## 2024-05-24 VITALS
SYSTOLIC BLOOD PRESSURE: 130 MMHG | DIASTOLIC BLOOD PRESSURE: 85 MMHG | BODY MASS INDEX: 34.55 KG/M2 | HEIGHT: 63 IN | OXYGEN SATURATION: 99 % | HEART RATE: 81 BPM | WEIGHT: 195 LBS

## 2024-05-24 DIAGNOSIS — R19.7 DIARRHEA, UNSPECIFIED TYPE: ICD-10-CM

## 2024-05-24 DIAGNOSIS — E66.09 CLASS 1 OBESITY DUE TO EXCESS CALORIES WITHOUT SERIOUS COMORBIDITY WITH BODY MASS INDEX (BMI) OF 34.0 TO 34.9 IN ADULT: ICD-10-CM

## 2024-05-24 DIAGNOSIS — R03.0 ELEVATED BLOOD PRESSURE READING IN OFFICE WITHOUT DIAGNOSIS OF HYPERTENSION: Primary | ICD-10-CM

## 2024-05-24 PROBLEM — E66.811 CLASS 1 OBESITY DUE TO EXCESS CALORIES WITHOUT SERIOUS COMORBIDITY WITH BODY MASS INDEX (BMI) OF 34.0 TO 34.9 IN ADULT: Status: ACTIVE | Noted: 2024-05-24

## 2024-05-24 NOTE — PROGRESS NOTES
"Chief Complaint  Hypertension    SUBJECTIVE  Clover Caicedo presents to [facility]    History of Present Illness  Patient is here for follow-up on hypertension.  She is not currently taking any BP meds.Patient blood pressure in office today is reading 130/85.  Denies any chest pain, shortness of breath, headache, head pressure.  She brings in home blood pressure log.  Overall stable blood pressure at home.  A couple outliers 140s over 90s.  Patient has started to work on diet and exercise.    Patient is still having liquid diarrhea every day.  Patient reports still has occasional abdominal pain that Bentyl provides some relief for her.  She is not able to get into gastro until October.  Will place some stool study orders today.    History reviewed. No pertinent past medical history.   History reviewed. No pertinent family history.   Past Surgical History:   Procedure Laterality Date    TUBAL ABDOMINAL LIGATION          Current Outpatient Medications:     dicyclomine (BENTYL) 10 MG capsule, Take 2 capsules by mouth 4 (Four) Times a Day Before Meals & at Bedtime., Disp: 120 capsule, Rfl: 5    OBJECTIVE  Vital Signs:   /85 (BP Location: Right arm, Patient Position: Sitting, Cuff Size: Large Adult)   Pulse 81   Ht 160 cm (63\")   Wt 88.5 kg (195 lb)   SpO2 99%   BMI 34.54 kg/m²    Estimated body mass index is 34.54 kg/m² as calculated from the following:    Height as of this encounter: 160 cm (63\").    Weight as of this encounter: 88.5 kg (195 lb).     Wt Readings from Last 3 Encounters:   05/24/24 88.5 kg (195 lb)   04/25/24 90.7 kg (200 lb)   12/07/23 89.1 kg (196 lb 6.9 oz)     BP Readings from Last 3 Encounters:   05/24/24 130/85   04/25/24 147/95   12/08/23 (!) 139/109       Physical Exam  Vitals reviewed.   Constitutional:       General: She is not in acute distress.     Appearance: She is not ill-appearing.   HENT:      Head: Normocephalic and atraumatic.   Eyes:      " Conjunctiva/sclera: Conjunctivae normal.   Cardiovascular:      Rate and Rhythm: Normal rate and regular rhythm.      Heart sounds: Normal heart sounds.   Pulmonary:      Effort: Pulmonary effort is normal.      Breath sounds: Normal breath sounds.   Musculoskeletal:      Cervical back: Normal range of motion.   Skin:     General: Skin is warm and dry.   Neurological:      Mental Status: She is alert and oriented to person, place, and time.   Psychiatric:         Mood and Affect: Mood normal.         Behavior: Behavior normal.         Thought Content: Thought content normal.         Judgment: Judgment normal.          Result Review    Common labs          12/7/2023    20:52 4/29/2024    11:17   Common Labs   Glucose 142  109    BUN 10  10    Creatinine 0.76  0.67    Sodium 138  139    Potassium 3.9  4.1    Chloride 99  103    Calcium 9.6  9.2    Albumin 4.7  4.6    Total Bilirubin 0.2  0.4    Alkaline Phosphatase 45  38    AST (SGOT) 23  21    ALT (SGPT) 37  31    WBC 7.01  6.66    Hemoglobin 14.3  14.1    Hematocrit 42.1  41.5    Platelets 329  299    Total Cholesterol  223    Triglycerides  154    HDL Cholesterol  58    LDL Cholesterol   138    Hemoglobin A1C  5.80        Mammo Screening Digital Tomosynthesis Bilateral With CAD    Result Date: 5/8/2024  Benign findings. No mammographic evidence of malignancy. Recommend routine mammographic screening.  BI-RADS ASSESSMENT: BI-RADS 2. Benign findings.  The patient's information is entered into a computerized reminder system with a targeted due date for the next mammogram.  Note:  It has been reported that there is approximately a 15% false negative rate in mammography.  Therefore, management of a palpable abnormality should not be deferred because of a negative mammogram.     Electronically Signed By-NATTY DOTSON MD On:5/8/2024 12:07 PM          The above data has been reviewed by NAVYA Diego 05/24/2024 11:13 EDT.          Patient Care Team:  Kayla Lane,  NAVYA as PCP - General (Nurse Practitioner)            ASSESSMENT & PLAN    Diagnoses and all orders for this visit:    1. Elevated blood pressure reading in office without diagnosis of hypertension (Primary)  Comments:  stable, discussed heart healthy diet, low-sodium, losing weight, continue to check blood pressure at home, follow-up if consistently 140s over 90  Overview:  stable, discussed heart healthy diet, low-sodium, losing weight, continue to check blood pressure at home, follow-up if consistently 140s over 90      2. Diarrhea, unspecified type  Comments:  stool studies ordered, continue bentyl as needed  Overview:  stool studies ordered, continue bentyl as needed    Orders:  -     Clostridioides difficile Toxin, PCR - Stool, Per Rectum; Future  -     Enteric Bacterial Panel - Stool, Per Rectum; Future  -     Enteric Parasite Panel - Stool, Per Rectum; Future  -     Fecal Lactoferrin Qual. - Stool, Per Rectum; Future  -     Pancreatic Elastase, Fecal - Stool, Per Rectum; Future  -     H. Pylori Antigen, Stool - Stool, Per Rectum; Future    3. Class 1 obesity due to excess calories without serious comorbidity with body mass index (BMI) of 34.0 to 34.9 in adult  Comments:  Calorie deficit, small frequent meals, tracking food, high-protein, exercise discussed         Tobacco Use: Low Risk  (5/24/2024)    Patient History     Smoking Tobacco Use: Never     Smokeless Tobacco Use: Never     Passive Exposure: Never       Follow Up     Return in about 3 months (around 8/24/2024) for Next scheduled follow up.      Patient was given instructions and counseling regarding her condition or for health maintenance advice. Please see specific information pulled into the AVS if appropriate.   I have reviewed information obtained and documented by others and I have confirmed the accuracy of this documented note.    NAVYA Diego

## 2024-05-29 ENCOUNTER — LAB (OUTPATIENT)
Dept: LAB | Facility: HOSPITAL | Age: 39
End: 2024-05-29

## 2024-05-29 DIAGNOSIS — R19.7 DIARRHEA, UNSPECIFIED TYPE: ICD-10-CM

## 2024-05-29 LAB
027 TOXIN: NORMAL
C DIFF TOX GENS STL QL NAA+PROBE: NEGATIVE
H. PYLORI ANTIGEN STOOL: NEGATIVE
LACTOFERRIN STL QL LA: NEGATIVE

## 2024-05-29 PROCEDURE — 87338 HPYLORI STOOL AG IA: CPT

## 2024-05-29 PROCEDURE — 83630 LACTOFERRIN FECAL (QUAL): CPT

## 2024-05-29 PROCEDURE — 87506 IADNA-DNA/RNA PROBE TQ 6-11: CPT

## 2024-05-29 PROCEDURE — 82653 EL-1 FECAL QUANTITATIVE: CPT

## 2024-05-29 PROCEDURE — 87493 C DIFF AMPLIFIED PROBE: CPT

## 2024-05-30 DIAGNOSIS — R19.7 DIARRHEA, UNSPECIFIED TYPE: Primary | ICD-10-CM

## 2024-05-30 LAB
C COLI+JEJ+UPSA DNA STL QL NAA+NON-PROBE: NOT DETECTED
CRYPTOSP DNA STL QL NAA+NON-PROBE: NOT DETECTED
E HISTOLYT DNA STL QL NAA+NON-PROBE: NOT DETECTED
EC STX1+STX2 GENES STL QL NAA+NON-PROBE: NOT DETECTED
G LAMBLIA DNA STL QL NAA+NON-PROBE: NOT DETECTED
S ENT+BONG DNA STL QL NAA+NON-PROBE: NOT DETECTED
SHIGELLA SP+EIEC IPAH ST NAA+NON-PROBE: NOT DETECTED

## 2024-05-30 RX ORDER — MONTELUKAST SODIUM 4 MG/1
1 TABLET, CHEWABLE ORAL 2 TIMES DAILY
Qty: 60 TABLET | Refills: 1 | Status: SHIPPED | OUTPATIENT
Start: 2024-05-30

## 2024-06-02 LAB — ELASTASE PANC STL-MCNT: 421 UG ELAST./G

## 2024-07-22 ENCOUNTER — TELEPHONE (OUTPATIENT)
Dept: OBSTETRICS AND GYNECOLOGY | Facility: CLINIC | Age: 39
End: 2024-07-22

## 2024-08-13 ENCOUNTER — TELEPHONE (OUTPATIENT)
Dept: GASTROENTEROLOGY | Facility: CLINIC | Age: 39
End: 2024-08-13

## 2024-08-13 NOTE — TELEPHONE ENCOUNTER
Attempted to contact pt to reschedule their appt on 8/22, NAVYA Chau will not be in the [place] office that day.

## 2024-08-13 NOTE — TELEPHONE ENCOUNTER
Pharmacy Medication Reconciliation      ~Medication reconciliation updated and complete per pt at bedside  ~Allergies have been verified and updated   ~No oral ABX within the last 30 days  ~Patient home pharmacy:[facility]             Pt returned call to office, rescheduled appt on 8/22 to 8/23 at 11AM. Pt is agreeable to this date and time.

## 2024-08-23 ENCOUNTER — OFFICE VISIT (OUTPATIENT)
Age: 39
End: 2024-08-23

## 2024-08-23 VITALS
OXYGEN SATURATION: 99 % | HEIGHT: 63 IN | SYSTOLIC BLOOD PRESSURE: 138 MMHG | BODY MASS INDEX: 34.38 KG/M2 | TEMPERATURE: 98.2 F | HEART RATE: 92 BPM | DIASTOLIC BLOOD PRESSURE: 92 MMHG | WEIGHT: 194 LBS

## 2024-08-23 DIAGNOSIS — Z87.19 HISTORY OF FATTY INFILTRATION OF LIVER: ICD-10-CM

## 2024-08-23 DIAGNOSIS — R19.4 ALTERED BOWEL HABITS: ICD-10-CM

## 2024-08-23 DIAGNOSIS — Z80.0 FAMILY HISTORY OF COLON CANCER: ICD-10-CM

## 2024-08-23 DIAGNOSIS — R10.32 LEFT LOWER QUADRANT ABDOMINAL PAIN: Primary | ICD-10-CM

## 2024-08-23 DIAGNOSIS — Z83.79 FAMILY HISTORY OF INFLAMMATORY BOWEL DISEASE: ICD-10-CM

## 2024-08-23 DIAGNOSIS — R11.0 NAUSEA: ICD-10-CM

## 2024-08-23 DIAGNOSIS — K92.1 BLOOD IN STOOL: ICD-10-CM

## 2024-08-23 NOTE — PROGRESS NOTES
Chief Complaint     Establish Care (LLQ pain, Diarrhea since April 2024)    History of Present Illness     Clover Caicedo is a 39 y.o. female who presents to [facility] on referral from NAVYA Diego for a gastroenterology evaluation of LLQ pain, Diarrhea, unspecified type, Nausea, Blood in stool, Family history of colon cancer.      Patient explains that in November of 2023 she started experiencing abdominal pain and episodes of diarrhea. In March 2024 she reports no longer having normal stool but now only having diarrhea. She reports left lower quadrant dull ache on the left upper and lower quadrants, lasting from hours to days. Patient feels that the Bentyl helps the abdominal pain. She denies BMs helping the pain.  Patient has an average of 1-3 BM as day, described as mushy and brown. Denies mucus. Reports blood in stool on 2 occasions. Denies black stool. Denies use of NSAIDs.     She reports nausea once a week. Worse in the morning. Heartburn is controlled with Prilosec. No vomiting, dysphagia, or unintentional weight loss.     Patient has a positive family history of colon cancer maternal grandfather at the age of 62. Unknown family history of colon polyps. Reports a positive family history of IBD, her father has Crohn's.  No previous history of  EGD/Colonoscopy         History      History reviewed. No pertinent past medical history.    Past Surgical History:   Procedure Laterality Date    TUBAL ABDOMINAL LIGATION         Family History   Problem Relation Age of Onset    Crohn's disease Father     Colon cancer Maternal Grandfather         Current Medications        Current Outpatient Medications:     dicyclomine (BENTYL) 10 MG capsule, Take 2 capsules by mouth 4 (Four) Times a Day Before Meals & at Bedtime., Disp: 120 capsule, Rfl: 5    colestipol (Colestid) 1 g tablet, Take 1 tablet by mouth 2 (Two) Times a Day. (Patient not taking: Reported on 8/23/2024), Disp: 60  "tablet, Rfl: 1    polyethylene glycol (GoLYTELY) 236 g solution, Follow office instructions., Disp: 4000 mL, Rfl: 0     Allergies     No Known Allergies    Social History       Social History     Social History Narrative    Not on file       Immunizations     Immunization:  Immunization History   Administered Date(s) Administered    Tdap 04/15/2016          Objective     Objective     Vital Signs:   /92 (BP Location: Right arm, Patient Position: Sitting, Cuff Size: Large Adult)   Pulse 92   Temp 98.2 °F (36.8 °C) (Oral)   Ht 160 cm (62.99\")   Wt 88 kg (194 lb)   SpO2 99%   BMI 34.37 kg/m²       Physical Exam  Constitutional:       Appearance: Normal appearance.   HENT:      Head: Normocephalic.      Nose: Nose normal.   Eyes:      Pupils: Pupils are equal, round, and reactive to light.   Pulmonary:      Effort: Pulmonary effort is normal.   Abdominal:      General: Bowel sounds are normal.   Musculoskeletal:         General: Normal range of motion.   Neurological:      General: No focal deficit present.      Mental Status: She is alert and oriented to person, place, and time.   Psychiatric:         Mood and Affect: Mood normal.         Behavior: Behavior normal.         Thought Content: Thought content normal.         Judgment: Judgment normal.         Results      Result Review :   The following data was reviewed by: NAVYA Talbert on 08/23/2024:    Lab Results - Last 18 Months   Lab Units 04/29/24  1117 12/07/23 2052   WBC 10*3/mm3 6.66 7.01   HEMOGLOBIN g/dL 14.1 14.3   MCV fL 89.1 87.7   PLATELETS 10*3/mm3 299 329         Lab Results - Last 18 Months   Lab Units 04/29/24  1117 12/07/23  2052   BUN mg/dL 10 10   CREATININE mg/dL 0.67 0.76   SODIUM mmol/L 139 138   POTASSIUM mmol/L 4.1 3.9   CHLORIDE mmol/L 103 99   CO2 mmol/L 23.3 23.9   GLUCOSE mg/dL 109* 142*      No results for input(s): \"PROTIME\", \"INR\", \"PTT\" in the last 20682 hours.  Lab Results - Last 18 Months   Lab Units " "04/29/24  1117 12/07/23 2052   AST (SGOT) U/L 21 23   ALT (SGPT) U/L 31 37*   ALK PHOS U/L 38* 45   BILIRUBIN mg/dL 0.4 0.2   TOTAL PROTEIN g/dL 7.4 7.6   ALBUMIN g/dL 4.6 4.7      No results for input(s): \"IRON\", \"TRANSFERRIN\", \"TIBC\", \"FERRITIN\", \"OWIFWMAC40\", \"FOLATE\" in the last 71979 hours.  No results for input(s): \"HAV\", \"HEPAIGM\", \"HEPBIGM\", \"HEPBCAB\", \"HBEAG\", \"HEPCAB\" in the last 43808 hours.    CT abdomen pelvis with contrast 12/7/2023:  No evidence for acute abnormality throughout the abdomen or pelvis.  Evidence for diffuse heterogeneous fatty infiltration.  Apparent hyperdense foci are seen which are likely related to areas of focal fatty sparing and differential enhancement for the phase of contrast.  Enhancing lesions are felt unlikely.  Follow-up nonemergent outpatient MRI of liver may be considered for confirmation.         Assessment and Plan        Assessment and Plan    Diagnoses and all orders for this visit:    1. Left lower quadrant abdominal pain (Primary)  -     Case Request; Standing  -     Case Request    2. Nausea    3. Blood in stool  -     Case Request; Standing  -     Case Request    4. Family history of colon cancer  -     Case Request; Standing  -     Case Request    5. Fatty liver    6. Altered bowel habits  -     Case Request; Standing  -     Case Request  -     Calprotectin, Fecal - Stool, Per Rectum; Future  -     Pancreatic Elastase, Fecal - Stool, Per Rectum; Future    7. History of fatty infiltration of liver  -     Hepatic Function Panel    Other orders  -     Follow Anesthesia Guidelines / Protocol; Standing  -     Follow Anesthesia Guidelines / Protocol; Future  -     Obtain Informed Consent; Future  -     Verify NPO; Standing  -     Verify Bowel Prep Was Successful; Standing  -     Give Tap Water Enema If Bowel Prep Insufficient; Standing  -     Obtain Informed Consent; Standing  -     polyethylene glycol (GoLYTELY) 236 g solution; Follow office instructions.  Dispense: " 4000 mL; Refill: 0        COLONOSCOPY (N/A)      Follow Up        Follow Up   Return for follow up after procedure.    Colonoscopy ordered due to altered bowel habits and BRB in stool. Please obtain left and right colon biopsies to r/o microscopic colitis. 4 liter bowel prep ordered. I have recommended that the patient undergo further evaluation with a colonoscopy. I have discussed this procedure in detail with the patient.  I have discussed the risk, benefits and alternatives.  I have discussed the risk of anesthesia, bleeding and perforation.  Patient understands these risks, benefits and alternatives and wishes to proceed.  I will schedule her at her earliest convenience.  Patient agreeable to this plan and will call with any questions or concerns.  Discussed the findings of the CT of the abdomen from December 2023 that showed fatty infiltration.  The patient states after her doctor told her about this she changed her diet and has lost 10 pounds.  I will repeat the hepatic function panel, if it remains elevated I will order a hepatic workup and an ultrasound of the liver.  Due to the diarrhea stool studies are ordered to rule out infectious or inflammatory causes as well as pancreatic insufficiency. I advised patient to maintain a healthy lifestyle: weight loss of 10%, cutting back on carbs, sugars, and fried fatty foods, limiting intake of soda and sugary drinks, and abstain from alcohol.  Agreeable to plan.  Follow-up after colonoscopy.    Patient was given instructions and counseling regarding her condition or for health maintenance advice. Please see specific information pulled into the AVS if appropriate.

## 2024-08-23 NOTE — H&P (VIEW-ONLY)
Chief Complaint     Establish Care (LLQ pain, Diarrhea since April 2024)    History of Present Illness     Clover Caicedo is a 39 y.o. female who presents to [facility] on referral from NAVYA Diego for a gastroenterology evaluation of LLQ pain, Diarrhea, unspecified type, Nausea, Blood in stool, Family history of colon cancer.      Patient explains that in November of 2023 she started experiencing abdominal pain and episodes of diarrhea. In March 2024 she reports no longer having normal stool but now only having diarrhea. She reports left lower quadrant dull ache on the left upper and lower quadrants, lasting from hours to days. Patient feels that the Bentyl helps the abdominal pain. She denies BMs helping the pain.  Patient has an average of 1-3 BM as day, described as mushy and brown. Denies mucus. Reports blood in stool on 2 occasions. Denies black stool. Denies use of NSAIDs.     She reports nausea once a week. Worse in the morning. Heartburn is controlled with Prilosec. No vomiting, dysphagia, or unintentional weight loss.     Patient has a positive family history of colon cancer maternal grandfather at the age of 62. Unknown family history of colon polyps. Reports a positive family history of IBD, her father has Crohn's.  No previous history of  EGD/Colonoscopy         History      History reviewed. No pertinent past medical history.    Past Surgical History:   Procedure Laterality Date    TUBAL ABDOMINAL LIGATION         Family History   Problem Relation Age of Onset    Crohn's disease Father     Colon cancer Maternal Grandfather         Current Medications        Current Outpatient Medications:     dicyclomine (BENTYL) 10 MG capsule, Take 2 capsules by mouth 4 (Four) Times a Day Before Meals & at Bedtime., Disp: 120 capsule, Rfl: 5    colestipol (Colestid) 1 g tablet, Take 1 tablet by mouth 2 (Two) Times a Day. (Patient not taking: Reported on 8/23/2024), Disp: 60  "tablet, Rfl: 1    polyethylene glycol (GoLYTELY) 236 g solution, Follow office instructions., Disp: 4000 mL, Rfl: 0     Allergies     No Known Allergies    Social History       Social History     Social History Narrative    Not on file       Immunizations     Immunization:  Immunization History   Administered Date(s) Administered    Tdap 04/15/2016          Objective     Objective     Vital Signs:   /92 (BP Location: Right arm, Patient Position: Sitting, Cuff Size: Large Adult)   Pulse 92   Temp 98.2 °F (36.8 °C) (Oral)   Ht 160 cm (62.99\")   Wt 88 kg (194 lb)   SpO2 99%   BMI 34.37 kg/m²       Physical Exam  Constitutional:       Appearance: Normal appearance.   HENT:      Head: Normocephalic.      Nose: Nose normal.   Eyes:      Pupils: Pupils are equal, round, and reactive to light.   Pulmonary:      Effort: Pulmonary effort is normal.   Abdominal:      General: Bowel sounds are normal.   Musculoskeletal:         General: Normal range of motion.   Neurological:      General: No focal deficit present.      Mental Status: She is alert and oriented to person, place, and time.   Psychiatric:         Mood and Affect: Mood normal.         Behavior: Behavior normal.         Thought Content: Thought content normal.         Judgment: Judgment normal.         Results      Result Review :   The following data was reviewed by: NAVYA Talbert on 08/23/2024:    Lab Results - Last 18 Months   Lab Units 04/29/24  1117 12/07/23 2052   WBC 10*3/mm3 6.66 7.01   HEMOGLOBIN g/dL 14.1 14.3   MCV fL 89.1 87.7   PLATELETS 10*3/mm3 299 329         Lab Results - Last 18 Months   Lab Units 04/29/24  1117 12/07/23  2052   BUN mg/dL 10 10   CREATININE mg/dL 0.67 0.76   SODIUM mmol/L 139 138   POTASSIUM mmol/L 4.1 3.9   CHLORIDE mmol/L 103 99   CO2 mmol/L 23.3 23.9   GLUCOSE mg/dL 109* 142*      No results for input(s): \"PROTIME\", \"INR\", \"PTT\" in the last 59736 hours.  Lab Results - Last 18 Months   Lab Units " "04/29/24  1117 12/07/23 2052   AST (SGOT) U/L 21 23   ALT (SGPT) U/L 31 37*   ALK PHOS U/L 38* 45   BILIRUBIN mg/dL 0.4 0.2   TOTAL PROTEIN g/dL 7.4 7.6   ALBUMIN g/dL 4.6 4.7      No results for input(s): \"IRON\", \"TRANSFERRIN\", \"TIBC\", \"FERRITIN\", \"WPUEOBKZ60\", \"FOLATE\" in the last 68129 hours.  No results for input(s): \"HAV\", \"HEPAIGM\", \"HEPBIGM\", \"HEPBCAB\", \"HBEAG\", \"HEPCAB\" in the last 34036 hours.    CT abdomen pelvis with contrast 12/7/2023:  No evidence for acute abnormality throughout the abdomen or pelvis.  Evidence for diffuse heterogeneous fatty infiltration.  Apparent hyperdense foci are seen which are likely related to areas of focal fatty sparing and differential enhancement for the phase of contrast.  Enhancing lesions are felt unlikely.  Follow-up nonemergent outpatient MRI of liver may be considered for confirmation.         Assessment and Plan        Assessment and Plan    Diagnoses and all orders for this visit:    1. Left lower quadrant abdominal pain (Primary)  -     Case Request; Standing  -     Case Request    2. Nausea    3. Blood in stool  -     Case Request; Standing  -     Case Request    4. Family history of colon cancer  -     Case Request; Standing  -     Case Request    5. Fatty liver    6. Altered bowel habits  -     Case Request; Standing  -     Case Request  -     Calprotectin, Fecal - Stool, Per Rectum; Future  -     Pancreatic Elastase, Fecal - Stool, Per Rectum; Future    7. History of fatty infiltration of liver  -     Hepatic Function Panel    Other orders  -     Follow Anesthesia Guidelines / Protocol; Standing  -     Follow Anesthesia Guidelines / Protocol; Future  -     Obtain Informed Consent; Future  -     Verify NPO; Standing  -     Verify Bowel Prep Was Successful; Standing  -     Give Tap Water Enema If Bowel Prep Insufficient; Standing  -     Obtain Informed Consent; Standing  -     polyethylene glycol (GoLYTELY) 236 g solution; Follow office instructions.  Dispense: " 4000 mL; Refill: 0        COLONOSCOPY (N/A)      Follow Up        Follow Up   Return for follow up after procedure.    Colonoscopy ordered due to altered bowel habits and BRB in stool. Please obtain left and right colon biopsies to r/o microscopic colitis. 4 liter bowel prep ordered. I have recommended that the patient undergo further evaluation with a colonoscopy. I have discussed this procedure in detail with the patient.  I have discussed the risk, benefits and alternatives.  I have discussed the risk of anesthesia, bleeding and perforation.  Patient understands these risks, benefits and alternatives and wishes to proceed.  I will schedule her at her earliest convenience.  Patient agreeable to this plan and will call with any questions or concerns.  Discussed the findings of the CT of the abdomen from December 2023 that showed fatty infiltration.  The patient states after her doctor told her about this she changed her diet and has lost 10 pounds.  I will repeat the hepatic function panel, if it remains elevated I will order a hepatic workup and an ultrasound of the liver.  Due to the diarrhea stool studies are ordered to rule out infectious or inflammatory causes as well as pancreatic insufficiency. I advised patient to maintain a healthy lifestyle: weight loss of 10%, cutting back on carbs, sugars, and fried fatty foods, limiting intake of soda and sugary drinks, and abstain from alcohol.  Agreeable to plan.  Follow-up after colonoscopy.    Patient was given instructions and counseling regarding her condition or for health maintenance advice. Please see specific information pulled into the AVS if appropriate.

## 2024-08-26 ENCOUNTER — PATIENT ROUNDING (BHMG ONLY) (OUTPATIENT)
Dept: GASTROENTEROLOGY | Facility: CLINIC | Age: 39
End: 2024-08-26

## 2024-08-26 NOTE — PROGRESS NOTES
8/26/2024      Hello, may I speak with Clover Centenod     My name is Arvind. I am calling from [facility]. I show that you had a recent visit with NAVYA Talbert.    Before we get started may I verify your date of birth? 1985    I am calling to officially welcome you to our practice and ask about your recent visit. Is this a good time to talk? No I left pt a VM     Tell me about your visit with us. What things went well?    We strive to ensure that we protect your safety and privacy. Is there anything we could have done to improve this during your visit?        We're always looking for ways to make our patients' experiences even better. Do you have recommendations on ways we may improve?    Overall were you satisfied with your first visit to our practice?    I appreciate you taking the time to speak with me today. Is there anything else I can do for you?    I am glad to hear that you had a very good visit and I appreciate you taking the time to provide feedback on this call. We would greatly appreciate you filling out a survey if you receive one in the mail, email or text. This is a great opportunity to provide any additional feedback that you may think of after this call as well.       Thank you, and have a great day.

## 2024-08-28 PROCEDURE — 82653 EL-1 FECAL QUANTITATIVE: CPT

## 2024-08-28 PROCEDURE — 83993 ASSAY FOR CALPROTECTIN FECAL: CPT

## 2024-08-31 LAB
CALPROTECTIN STL-MCNT: <5 UG/G (ref 0–120)
ELASTASE PANC STL-MCNT: >800 UG ELAST./G

## 2024-09-03 ENCOUNTER — TELEPHONE (OUTPATIENT)
Dept: GASTROENTEROLOGY | Facility: CLINIC | Age: 39
End: 2024-09-03

## 2024-09-06 ENCOUNTER — ANESTHESIA EVENT (OUTPATIENT)
Dept: GASTROENTEROLOGY | Facility: HOSPITAL | Age: 39
End: 2024-09-06
Payer: MEDICAID

## 2024-09-06 NOTE — PRE-PROCEDURE INSTRUCTIONS
"PAT call attempted.  No answer.  Detailed message with date and arrival time of 1000 given.  Instructed that arrival time is not procedure time but allows time to prepare for procedure. Come to entrance \"C\"; must have adult  for transportation home; may have two visitors; however, children under 12 must remain in waiting area; instructed on diet/clear liquids/NPO/bowel prep, if needed; may take normal meds two hours prior to arrival time except for blood thinners, antidiabetics, diuretics, and weight loss meds.  Instructed to return call to confirm receipt of instructions and for any questions.  "

## 2024-09-06 NOTE — ANESTHESIA PREPROCEDURE EVALUATION
Anesthesia Evaluation     Patient summary reviewed and Nursing notes reviewed   NPO Solid Status: > 8 hours  NPO Liquid Status: > 6 hours           Airway   Mallampati: I  TM distance: >3 FB  Neck ROM: full  No difficulty expected  Dental - normal exam     Pulmonary     breath sounds clear to auscultation  Cardiovascular     ECG reviewed  Rhythm: regular  Rate: normal      ROS comment: EKG 12/8/2023   - OTHERWISE NORMAL ECG -  Sinus rhythm  Abnormal R-wave progression, late transition  Minimal ST elevation, inferior leads  No previous ECG available for comparison    Echo 12/14/2018   · Left ventricular size and function are normal. Biplane LV ejection   fraction is 60%.   · Diastolic function is normal   · Right ventricular size and function are normal.   · No hemodynamically significant valvular disease.   · Right ventricular systolic pressure estimated to be normal   · No evidence of inter-atrial communication on agitated saline study.     Neuro/Psych  GI/Hepatic/Renal/Endo    (+) obesity    Musculoskeletal     Abdominal    Substance History      OB/GYN      Comment:   Tubal ligation      Other            Phys Exam Other: Pt has lower jaw piercing to left side - did not remove it prior to procedure per pt.                   Anesthesia Plan    ASA 2     general   total IV anesthesia  (Total IV Anesthesia    Patient understands anesthesia not responsible for dental damage.  )  intravenous induction     Anesthetic plan, risks, benefits, and alternatives have been provided, discussed and informed consent has been obtained with: patient.    Plan discussed with CRNA.        CODE STATUS:

## 2024-09-09 ENCOUNTER — ANESTHESIA (OUTPATIENT)
Dept: GASTROENTEROLOGY | Facility: HOSPITAL | Age: 39
End: 2024-09-09
Payer: MEDICAID

## 2024-09-09 ENCOUNTER — HOSPITAL ENCOUNTER (OUTPATIENT)
Facility: HOSPITAL | Age: 39
Setting detail: HOSPITAL OUTPATIENT SURGERY
Discharge: HOME | End: 2024-09-09
Attending: INTERNAL MEDICINE | Admitting: INTERNAL MEDICINE
Payer: MEDICAID

## 2024-09-09 VITALS
OXYGEN SATURATION: 100 % | BODY MASS INDEX: 34.14 KG/M2 | HEART RATE: 75 BPM | WEIGHT: 192.68 LBS | SYSTOLIC BLOOD PRESSURE: 145 MMHG | TEMPERATURE: 96.5 F | RESPIRATION RATE: 23 BRPM | DIASTOLIC BLOOD PRESSURE: 80 MMHG

## 2024-09-09 DIAGNOSIS — R19.4 ALTERED BOWEL HABITS: ICD-10-CM

## 2024-09-09 DIAGNOSIS — K92.1 BLOOD IN STOOL: ICD-10-CM

## 2024-09-09 DIAGNOSIS — Z80.0 FAMILY HISTORY OF COLON CANCER: ICD-10-CM

## 2024-09-09 DIAGNOSIS — R10.32 LEFT LOWER QUADRANT ABDOMINAL PAIN: ICD-10-CM

## 2024-09-09 PROCEDURE — 25010000002 PROPOFOL 10 MG/ML EMULSION: Performed by: NURSE ANESTHETIST, CERTIFIED REGISTERED

## 2024-09-09 PROCEDURE — 88305 TISSUE EXAM BY PATHOLOGIST: CPT | Performed by: INTERNAL MEDICINE

## 2024-09-09 PROCEDURE — 25810000003 LACTATED RINGERS PER 1000 ML

## 2024-09-09 RX ORDER — SODIUM CHLORIDE, SODIUM LACTATE, POTASSIUM CHLORIDE, CALCIUM CHLORIDE 600; 310; 30; 20 MG/100ML; MG/100ML; MG/100ML; MG/100ML
30 INJECTION, SOLUTION INTRAVENOUS CONTINUOUS
Status: DISCONTINUED | OUTPATIENT
Start: 2024-09-09 | End: 2024-09-09 | Stop reason: HOSPADM

## 2024-09-09 RX ORDER — LIDOCAINE HYDROCHLORIDE 20 MG/ML
INJECTION, SOLUTION EPIDURAL; INFILTRATION; INTRACAUDAL; PERINEURAL AS NEEDED
Status: DISCONTINUED | OUTPATIENT
Start: 2024-09-09 | End: 2024-09-09 | Stop reason: SURG

## 2024-09-09 RX ORDER — PROPOFOL 10 MG/ML
VIAL (ML) INTRAVENOUS AS NEEDED
Status: DISCONTINUED | OUTPATIENT
Start: 2024-09-09 | End: 2024-09-09 | Stop reason: SURG

## 2024-09-09 RX ADMIN — LIDOCAINE HYDROCHLORIDE 100 MG: 20 INJECTION, SOLUTION EPIDURAL; INFILTRATION; INTRACAUDAL; PERINEURAL at 09:08

## 2024-09-09 RX ADMIN — PROPOFOL 100 MG: 10 INJECTION, EMULSION INTRAVENOUS at 09:08

## 2024-09-09 RX ADMIN — PROPOFOL 250 MCG/KG/MIN: 10 INJECTION, EMULSION INTRAVENOUS at 09:08

## 2024-09-09 RX ADMIN — SODIUM CHLORIDE, POTASSIUM CHLORIDE, SODIUM LACTATE AND CALCIUM CHLORIDE: 600; 310; 30; 20 INJECTION, SOLUTION INTRAVENOUS at 09:05

## 2024-09-09 NOTE — ANESTHESIA POSTPROCEDURE EVALUATION
Patient: Clover Caicedo    Procedure Summary       Date: 09/09/24 Room / Location: [facility] ENDOSCOPY 5 / [facility] ENDOSCOPY    Anesthesia Start: 0905 Anesthesia Stop: 0946    Procedure: COLONOSCOPY WITH BIOPSIES, COLD SNARE POLYPECTOMY Diagnosis:       Left lower quadrant abdominal pain      Blood in stool      Family history of colon cancer      Altered bowel habits      (Left lower quadrant abdominal pain [R10.32])      (Blood in stool [K92.1])      (Family history of colon cancer [Z80.0])      (Altered bowel habits [R19.4])    Surgeons: Duglas Varma MD Provider: Mychal Ramos CRNA    Anesthesia Type: general ASA Status: 2            Anesthesia Type: general    Vitals  Vitals Value Taken Time   /102 09/09/24 0959   Temp 35.8 °C (96.5 °F) 09/09/24 0958   Pulse 67 09/09/24 1004   Resp 23 09/09/24 0958   SpO2 99 % 09/09/24 1001   Vitals shown include unfiled device data.        Post Anesthesia Care and Evaluation    Post-procedure mental status: acceptable.  Pain management: satisfactory to patient    Airway patency: patent  Anesthetic complications: No anesthetic complications    Cardiovascular status: acceptable  Respiratory status: acceptable    Comments: Per chart review

## 2024-09-10 LAB
CYTO UR: NORMAL
LAB AP CASE REPORT: NORMAL
LAB AP CLINICAL INFORMATION: NORMAL
PATH REPORT.FINAL DX SPEC: NORMAL
PATH REPORT.GROSS SPEC: NORMAL

## 2024-09-23 ENCOUNTER — TELEPHONE (OUTPATIENT)
Dept: GASTROENTEROLOGY | Facility: CLINIC | Age: 39
End: 2024-09-23
Payer: MEDICAID

## 2024-10-15 ENCOUNTER — OFFICE VISIT (OUTPATIENT)
Dept: GASTROENTEROLOGY | Facility: CLINIC | Age: 39
End: 2024-10-15

## 2024-10-15 ENCOUNTER — LAB (OUTPATIENT)
Dept: LAB | Facility: HOSPITAL | Age: 39
End: 2024-10-15

## 2024-10-15 ENCOUNTER — TELEPHONE (OUTPATIENT)
Dept: GASTROENTEROLOGY | Facility: CLINIC | Age: 39
End: 2024-10-15

## 2024-10-15 VITALS
SYSTOLIC BLOOD PRESSURE: 135 MMHG | WEIGHT: 198 LBS | BODY MASS INDEX: 35.08 KG/M2 | HEART RATE: 88 BPM | DIASTOLIC BLOOD PRESSURE: 83 MMHG | OXYGEN SATURATION: 97 %

## 2024-10-15 DIAGNOSIS — R10.12 LUQ ABDOMINAL PAIN: ICD-10-CM

## 2024-10-15 DIAGNOSIS — E78.2 MIXED HYPERLIPIDEMIA: ICD-10-CM

## 2024-10-15 DIAGNOSIS — R10.11 RIGHT UPPER QUADRANT ABDOMINAL PAIN: ICD-10-CM

## 2024-10-15 DIAGNOSIS — R19.4 ALTERED BOWEL HABITS: ICD-10-CM

## 2024-10-15 DIAGNOSIS — R19.4 ALTERED BOWEL HABITS: Primary | ICD-10-CM

## 2024-10-15 DIAGNOSIS — R73.03 PREDIABETES: ICD-10-CM

## 2024-10-15 LAB
ALBUMIN SERPL-MCNC: 4.5 G/DL (ref 3.5–5.2)
ALP SERPL-CCNC: 43 U/L (ref 39–117)
ALT SERPL W P-5'-P-CCNC: 42 U/L (ref 1–33)
AST SERPL-CCNC: 21 U/L (ref 1–32)
BASOPHILS # BLD AUTO: 0.04 10*3/MM3 (ref 0–0.2)
BASOPHILS NFR BLD AUTO: 0.4 % (ref 0–1.5)
BILIRUB CONJ SERPL-MCNC: <0.2 MG/DL (ref 0–0.3)
BILIRUB INDIRECT SERPL-MCNC: ABNORMAL MG/DL
BILIRUB SERPL-MCNC: <0.2 MG/DL (ref 0–1.2)
CHOLEST SERPL-MCNC: 212 MG/DL (ref 0–200)
DEPRECATED RDW RBC AUTO: 38.8 FL (ref 37–54)
EOSINOPHIL # BLD AUTO: 0.34 10*3/MM3 (ref 0–0.4)
EOSINOPHIL NFR BLD AUTO: 3.7 % (ref 0.3–6.2)
ERYTHROCYTE [DISTWIDTH] IN BLOOD BY AUTOMATED COUNT: 12.1 % (ref 12.3–15.4)
HBA1C MFR BLD: 5.8 % (ref 4.8–5.6)
HCT VFR BLD AUTO: 37.2 % (ref 34–46.6)
HDLC SERPL-MCNC: 71 MG/DL (ref 40–60)
HGB BLD-MCNC: 13.4 G/DL (ref 12–15.9)
IMM GRANULOCYTES # BLD AUTO: 0.04 10*3/MM3 (ref 0–0.05)
IMM GRANULOCYTES NFR BLD AUTO: 0.4 % (ref 0–0.5)
LDLC SERPL CALC-MCNC: 112 MG/DL (ref 0–100)
LDLC/HDLC SERPL: 1.52 {RATIO}
LYMPHOCYTES # BLD AUTO: 2.43 10*3/MM3 (ref 0.7–3.1)
LYMPHOCYTES NFR BLD AUTO: 26.4 % (ref 19.6–45.3)
MCH RBC QN AUTO: 32.3 PG (ref 26.6–33)
MCHC RBC AUTO-ENTMCNC: 36 G/DL (ref 31.5–35.7)
MCV RBC AUTO: 89.6 FL (ref 79–97)
MONOCYTES # BLD AUTO: 0.54 10*3/MM3 (ref 0.1–0.9)
MONOCYTES NFR BLD AUTO: 5.9 % (ref 5–12)
NEUTROPHILS NFR BLD AUTO: 5.81 10*3/MM3 (ref 1.7–7)
NEUTROPHILS NFR BLD AUTO: 63.2 % (ref 42.7–76)
NRBC BLD AUTO-RTO: 0 /100 WBC (ref 0–0.2)
PLATELET # BLD AUTO: 281 10*3/MM3 (ref 140–450)
PMV BLD AUTO: 11.4 FL (ref 6–12)
PROT SERPL-MCNC: 7.7 G/DL (ref 6–8.5)
RBC # BLD AUTO: 4.15 10*6/MM3 (ref 3.77–5.28)
TRIGL SERPL-MCNC: 166 MG/DL (ref 0–150)
VLDLC SERPL-MCNC: 29 MG/DL (ref 5–40)
WBC NRBC COR # BLD AUTO: 9.2 10*3/MM3 (ref 3.4–10.8)

## 2024-10-15 PROCEDURE — 86003 ALLG SPEC IGE CRUDE XTRC EA: CPT

## 2024-10-15 PROCEDURE — 80076 HEPATIC FUNCTION PANEL: CPT

## 2024-10-15 PROCEDURE — 82390 ASSAY OF CERULOPLASMIN: CPT

## 2024-10-15 PROCEDURE — 85025 COMPLETE CBC W/AUTO DIFF WBC: CPT

## 2024-10-15 PROCEDURE — 84466 ASSAY OF TRANSFERRIN: CPT

## 2024-10-15 PROCEDURE — 83036 HEMOGLOBIN GLYCOSYLATED A1C: CPT

## 2024-10-15 PROCEDURE — 86008 ALLG SPEC IGE RECOMB EA: CPT

## 2024-10-15 PROCEDURE — 86231 EMA EACH IG CLASS: CPT

## 2024-10-15 PROCEDURE — 82784 ASSAY IGA/IGD/IGG/IGM EACH: CPT

## 2024-10-15 PROCEDURE — 99214 OFFICE O/P EST MOD 30 MIN: CPT

## 2024-10-15 PROCEDURE — 36415 COLL VENOUS BLD VENIPUNCTURE: CPT

## 2024-10-15 PROCEDURE — 83540 ASSAY OF IRON: CPT

## 2024-10-15 PROCEDURE — 80061 LIPID PANEL: CPT

## 2024-10-15 PROCEDURE — 82785 ASSAY OF IGE: CPT

## 2024-10-15 PROCEDURE — 82728 ASSAY OF FERRITIN: CPT

## 2024-10-15 PROCEDURE — 86364 TISS TRNSGLTMNASE EA IG CLAS: CPT

## 2024-10-15 NOTE — TELEPHONE ENCOUNTER
Hub staff attempted to follow warm transfer process and was unsuccessful     Caller: Clover Caicedo    Relationship to patient: Self    Best call back number: [phone]    Patient is needing: PT HAS APPT. AT 1:00 TODAY 10.15 WITH [facility]. WOULD LIKE TO KNOW IF IT'S AN OPTION TO DO A PHONE OR VIDEO VISIT INSTEAD. SHE IS IN [place] TODAY.

## 2024-10-15 NOTE — PROGRESS NOTES
Chief Complaint     Colonoscopy and Follow-up (Pt states things are about the same, she is still having diarrhea daily )    History of Present Illness     Clover Caicedo is a 39 y.o. female who presents to [facility] for follow-up of Colonoscopy.    History of Present Illness:  Patient establish care on 4 4 8/23/2020 left lower quadrant pain and diarrhea. Patient explains that in November of 2023 she started experiencing abdominal pain and episodes of diarrhea. In March 2024 she reports no longer having normal stool but now only having diarrhea. She reports left lower quadrant dull ache on the left upper and lower quadrants, lasting from hours to days. Patient feels that the Bentyl helps the abdominal pain. She denies BMs helping the pain.  Patient has an average of 1-3 BM as day, described as mushy and brown. Denies mucus. Reports blood in stool on 2 occasions. Denies black stool. Denies use of NSAIDs. She reports nausea once a week. Worse in the morning. Heartburn is controlled with Prilosec. No vomiting, dysphagia, or unintentional weight loss.      Patient has a positive family history of colon cancer maternal grandfather at the age of 62. Unknown family history of colon polyps. Reports a positive family history of IBD, her father has Crohn's.  No previous history of  EGD/Colonoscopy. Colonoscopy ordered due to altered bowel habits and BRB in stool. Please obtain left and right colon biopsies to r/o microscopic colitis. Repeat the hepatic function panel, if it remains elevated I will order a hepatic workup and an ultrasound of the liver.     8/28/2024 Stool studies negative for infectious or inflammatory causes.    9/9/2024 Colonoscopy: findings of diverticulosis and non-bleeding non-thrombosed non-prolapsed external hemorrhoids.  Biopsies from the right and left side of the colon has rule out for microscopic versus collagenous versus eosinophilic colitis. One Sigmoid colon polyp  appeared to be hyperplastic. Continue low FODMAP diet.  If diarrhea is bothersome then should consider to add cholestyramine twice daily. Repeat is screening colonoscopy at the age of 43 years    10/15/2024 Office visit: Patient explains she has been continuing to have diarrhea and stomach pain on the left lower side as a dull ache and burning. She has been taking the Dicyclomine but symptoms have not improved. Reports 2 BM a day, described as diarrhea and yellow, green, and brown in color. No hematochezia or melena    Patient reports nausea but no vomiting or hematemesis. She has heartburn every couple of weeks. No reports of unintentional weight loss. She notices some trouble swallowing with increased stress noticed with liquids and solids.      History      History reviewed. No pertinent past medical history.  Past Surgical History:   Procedure Laterality Date    COLONOSCOPY N/A 9/9/2024    Procedure: COLONOSCOPY WITH BIOPSIES, COLD SNARE POLYPECTOMY;  Surgeon: Duglas Varma MD;  Location: [facility] ENDOSCOPY;  Service: Gastroenterology;  Laterality: N/A;  COLON POLYP, MILD DIVERTICULOSIS, HEMORRHOIDS    TUBAL ABDOMINAL LIGATION       Family History   Problem Relation Age of Onset    Crohn's disease Father     Colon cancer Maternal Grandfather         Current Medications       Current Outpatient Medications:     dicyclomine (BENTYL) 10 MG capsule, Take 2 capsules by mouth 4 (Four) Times a Day Before Meals & at Bedtime., Disp: 120 capsule, Rfl: 5     Allergies     No Known Allergies    Social History       Social History     Social History Narrative    Not on file         Objective       /83 (BP Location: Left arm, Patient Position: Sitting, Cuff Size: Adult)   Pulse 88   Wt 89.8 kg (198 lb)   SpO2 97%   BMI 35.08 kg/m²       Physical Exam  HENT:      Head: Normocephalic.   Cardiovascular:      Rate and Rhythm: Normal rate.   Pulmonary:      Effort: Pulmonary effort is normal.   Abdominal:       "General: Bowel sounds are normal.   Musculoskeletal:         General: Normal range of motion.   Neurological:      General: No focal deficit present.      Mental Status: She is alert.   Psychiatric:         Mood and Affect: Mood normal.         Results       Result Review :    The following data was reviewed by: NAVYA Talbert on 10/15/2024:    Lab Results - Last 18 Months   Lab Units 04/29/24  1117 12/07/23 2052   WBC 10*3/mm3 6.66 7.01   HEMOGLOBIN g/dL 14.1 14.3   MCV fL 89.1 87.7   PLATELETS 10*3/mm3 299 329         Lab Results - Last 18 Months   Lab Units 04/29/24  1117 12/07/23 2052   BUN mg/dL 10 10   CREATININE mg/dL 0.67 0.76   SODIUM mmol/L 139 138   POTASSIUM mmol/L 4.1 3.9   CHLORIDE mmol/L 103 99   CO2 mmol/L 23.3 23.9   GLUCOSE mg/dL 109* 142*      No results for input(s): \"PROTIME\", \"INR\", \"PTT\" in the last 44031 hours.  Lab Results - Last 18 Months   Lab Units 04/29/24  1117 12/07/23 2052   AST (SGOT) U/L 21 23   ALT (SGPT) U/L 31 37*   ALK PHOS U/L 38* 45   BILIRUBIN mg/dL 0.4 0.2   TOTAL PROTEIN g/dL 7.4 7.6   ALBUMIN g/dL 4.6 4.7      No results for input(s): \"IRON\", \"TRANSFERRIN\", \"TIBC\", \"FERRITIN\", \"QWXEXXFR30\", \"FOLATE\" in the last 15519 hours.  No results for input(s): \"HAV\", \"HEPAIGM\", \"HEPBIGM\", \"HEPBCAB\", \"HBEAG\", \"HEPCAB\" in the last 30433 hours.    No Images in the past 120 days found..         Assessment and Plan              Diagnoses and all orders for this visit:    1. Altered bowel habits (Primary)  -     Celiac Disease Panel; Future  -     Alpha-Gal IgE Panel; Future  -     Allergens (52) Food; Future  -     CBC & Differential; Future    2. Right upper quadrant abdominal pain  -     Cancel: US Abdomen Complete; Future  -     Cancel: Hepatic Function Panel  -     US Abdomen Complete; Future    3. LUQ abdominal pain  -     Cancel: US Abdomen Complete; Future  -     US Abdomen Complete; Future          * Surgery not found *    Follow Up     Follow Up   Return in " about 3 months (around 1/15/2025).    US abdomen for RUQ and LUQ pain.  Celiac disease panel and alpha gal test and food allergens and lactose intolerant.   Hepatic function panel    Patient was given instructions and counseling regarding her condition or for health maintenance advice. Please see specific information pulled into the AVS if appropriate.

## 2024-10-15 NOTE — TELEPHONE ENCOUNTER
Please read message on 10.15.24. Patient stated that she worked out a ride and she can come in person now. No mychart video needed.

## 2024-10-16 ENCOUNTER — HOSPITAL ENCOUNTER (OUTPATIENT)
Dept: ULTRASOUND IMAGING | Facility: HOSPITAL | Age: 39
Discharge: HOME | End: 2024-10-16

## 2024-10-16 DIAGNOSIS — R10.11 RIGHT UPPER QUADRANT ABDOMINAL PAIN: ICD-10-CM

## 2024-10-16 DIAGNOSIS — R79.89 ELEVATED LIVER FUNCTION TESTS: Primary | ICD-10-CM

## 2024-10-16 DIAGNOSIS — R10.12 LUQ ABDOMINAL PAIN: ICD-10-CM

## 2024-10-16 LAB
CERULOPLASMIN SERPL-MCNC: 25 MG/DL (ref 19–39)
FERRITIN SERPL-MCNC: 169 NG/ML (ref 13–150)
IRON 24H UR-MRATE: 88 MCG/DL (ref 37–145)
IRON SATN MFR SERPL: 23 % (ref 20–50)
TIBC SERPL-MCNC: 387 MCG/DL (ref 298–536)
TRANSFERRIN SERPL-MCNC: 260 MG/DL (ref 200–360)

## 2024-10-16 PROCEDURE — 76700 US EXAM ABDOM COMPLETE: CPT

## 2024-10-17 ENCOUNTER — TELEPHONE (OUTPATIENT)
Age: 39
End: 2024-10-17
Payer: MEDICAID

## 2024-10-17 LAB
ENDOMYSIUM IGA SER QL: NEGATIVE
IGA SERPL-MCNC: 199 MG/DL (ref 87–352)
TTG IGA SER-ACNC: <2 U/ML (ref 0–3)

## 2024-10-17 NOTE — TELEPHONE ENCOUNTER
Contacted pt to let her know that fatty liver was found on the US and Yolanda Pulido would like to schedule a f/u appt. Was able to get pt scheduled for 11/25 pt agreeable to date and time.

## 2024-10-18 ENCOUNTER — TELEPHONE (OUTPATIENT)
Age: 39
End: 2024-10-18
Payer: MEDICAID

## 2024-10-18 NOTE — TELEPHONE ENCOUNTER
Attempted to contact pt to go over results and remind about other labs remaining to be drawn. Left pt a detailed vm (okay per vr)

## 2024-10-18 NOTE — TELEPHONE ENCOUNTER
----- Message from Yolanda Pulido sent at 10/18/2024  1:33 PM EDT -----  Ferritin elevated at 169, can be elevated due to inflammatory processes such as fatty liver.  Iron profile within normal limits.  Ceruloplasmin within normal limits.  Please have the patient have the other labs drawn that were ordered on 10/16/2024 thank you.

## 2024-10-19 LAB
ALPHA-GAL IGE QN: <0.1 KU/L
BEEF IGE QN: <0.1 KU/L
CONV CLASS DESCRIPTION: NORMAL
IGE SERPL-ACNC: 94 IU/ML (ref 6–495)
LAMB IGE QN: <0.1 KU/L
PORK IGE QN: <0.1 KU/L

## 2024-10-20 LAB
A-LACTALB IGE QN: <0.1 KU/L
ALMOND IGE QN: <0.1 KU/L
APPLE IGE QN: <0.1 KU/L
AVOCADO IGE QN: <0.1 KU/L
BAKER'S YEAST IGE QN: <0.1 KU/L
BANANA IGE QN: <0.1 KU/L
BEEF IGE QN: <0.1 KU/L
BLACK PEPPER IGE QN: <0.1 KU/L
BROCCOLI IGE QN: <0.1 KU/L
CABBAGE IGE QN: <0.1 KU/L
CARROT IGE QN: <0.1 KU/L
CASHEW NUT IGE QN: <0.1 KU/L
CHEESE MOLD IGE QN: 0.12 KU/L
CHICKEN MEAT IGE QN: <0.1 KU/L
CHILI PEPPER IGE QN: ABNORMAL KU/L
COCOA IGE QN: <0.1 KU/L
COFFEE IGE QN: <0.1 KU/L
CONV CLASS DESCRIPTION: ABNORMAL
CORN IGE QN: <0.1 KU/L
COW MILK IGE QN: 0.63 KU/L
CRAB IGE QN: <0.1 KU/L
EGG WHITE IGE QN: <0.1 KU/L
EGG YOLK IGE QN: <0.1 KU/L
GARLIC IGE QN: <0.1 KU/L
GRAPE IGE QN: <0.1 KU/L
GREEN BEAN IGE QN: <0.1 KU/L
HADDOCK IGE QN: <0.1 KU/L
HALIBUT IGE QN: <0.1 KU/L
HAZELNUT IGE QN: <0.1 KU/L
LAMB IGE QN: <0.1 KU/L
LEMON IGE QN: <0.1 KU/L
MELON IGE QN: <0.1 KU/L
MUSHROOM IGE QN: 0.15 KU/L
OAT IGE QN: <0.1 KU/L
ONION IGE QN: <0.1 KU/L
ORANGE IGE QN: <0.1 KU/L
OYSTER IGE QN: <0.1 KU/L
PEANUT IGE QN: <0.1 KU/L
PORK IGE QN: <0.1 KU/L
POTATO IGE QN: <0.1 KU/L
RICE IGE QN: <0.1 KU/L
RYE IGE QN: <0.1 KU/L
SALMON IGE QN: <0.1 KU/L
SHRIMP IGE QN: <0.1 KU/L
SOYBEAN IGE QN: <0.1 KU/L
STRAWBERRY IGE QN: <0.1 KU/L
TEA IGE QN: <0.1 KU/L
TOMATO IGE QN: <0.1 KU/L
TUNA IGE QN: <0.1 KU/L
TURKEY MEAT IGE QN: <0.1 KU/L
VANILLA IGE QN: <0.1 KU/L
WHEAT IGE QN: 0.15 KU/L
WHOLE EGG IGE QN: 0.11 KU/L

## 2024-10-21 ENCOUNTER — TELEPHONE (OUTPATIENT)
Dept: GASTROENTEROLOGY | Facility: CLINIC | Age: 39
End: 2024-10-21

## 2024-10-21 DIAGNOSIS — T78.1XXA FOOD SENSITIVITY WITH GASTROINTESTINAL SYMPTOMS: Primary | ICD-10-CM

## 2024-10-21 NOTE — TELEPHONE ENCOUNTER
----- Message from Yolanda Pulido sent at 10/21/2024  3:11 PM EDT -----  Food Allergens serology indicate a low reaction to Cow's milk, Wheat, Mold Cheese, Mushroom, and Egg. Referral placed for Allergist due to food allergens panel.     Alpha gal serology panel is negative.

## 2024-11-15 ENCOUNTER — TELEPHONE (OUTPATIENT)
Age: 39
End: 2024-11-15

## 2025-02-07 ENCOUNTER — TELEPHONE (OUTPATIENT)
Dept: GASTROENTEROLOGY | Facility: CLINIC | Age: 40
End: 2025-02-07

## 2025-02-07 NOTE — TELEPHONE ENCOUNTER
LVM for pt to return our call to help her get schedule with [facility]. I left our number and [facility] number on their VM.Sending letter

## (undated) DEVICE — Device

## (undated) DEVICE — SINGLE-USE BIOPSY FORCEPS: Brand: RADIAL JAW 4

## (undated) DEVICE — SNAR E/S POLYP SNAREMASTER OVL/10MM 2.8X2300MM YEL

## (undated) DEVICE — SOLIDIFIER LIQLOC PLS 1500CC BT

## (undated) DEVICE — DISPOSABLE DISTAL ATTACHMENT: Brand: DISPOSABLE DISTAL ATTACHMENT

## (undated) DEVICE — Device: Brand: DEFENDO AIR/WATER/SUCTION AND BIOPSY VALVE

## (undated) DEVICE — SOL IRRG H2O PL/BG 1000ML STRL

## (undated) DEVICE — THE SINGLE USE ETRAP – POLYP TRAP IS USED FOR SUCTION RETRIEVAL OF ENDOSCOPICALLY REMOVED POLYPS.: Brand: ETRAP